# Patient Record
Sex: MALE | Race: WHITE | NOT HISPANIC OR LATINO | Employment: FULL TIME | URBAN - METROPOLITAN AREA
[De-identification: names, ages, dates, MRNs, and addresses within clinical notes are randomized per-mention and may not be internally consistent; named-entity substitution may affect disease eponyms.]

---

## 2017-12-07 ENCOUNTER — COMMUNICATION - HEALTHEAST (OUTPATIENT)
Dept: ENDOCRINOLOGY | Facility: CLINIC | Age: 27
End: 2017-12-07

## 2017-12-08 ENCOUNTER — RECORDS - HEALTHEAST (OUTPATIENT)
Dept: ADMINISTRATIVE | Facility: OTHER | Age: 27
End: 2017-12-08

## 2017-12-14 ENCOUNTER — COMMUNICATION - HEALTHEAST (OUTPATIENT)
Dept: ENDOCRINOLOGY | Facility: CLINIC | Age: 27
End: 2017-12-14

## 2017-12-29 ENCOUNTER — COMMUNICATION - HEALTHEAST (OUTPATIENT)
Dept: ENDOCRINOLOGY | Facility: CLINIC | Age: 27
End: 2017-12-29

## 2017-12-29 DIAGNOSIS — E11.9 DIABETES MELLITUS (H): ICD-10-CM

## 2018-01-04 ENCOUNTER — OFFICE VISIT - HEALTHEAST (OUTPATIENT)
Dept: ENDOCRINOLOGY | Facility: CLINIC | Age: 28
End: 2018-01-04

## 2018-01-04 ENCOUNTER — COMMUNICATION - HEALTHEAST (OUTPATIENT)
Dept: ENDOCRINOLOGY | Facility: CLINIC | Age: 28
End: 2018-01-04

## 2018-01-04 DIAGNOSIS — E11.9 DIABETES MELLITUS (H): ICD-10-CM

## 2018-01-04 LAB
CALCIUM SERPL-MCNC: 9.6 MG/DL (ref 8.5–10.5)
CREAT SERPL-MCNC: 0.96 MG/DL (ref 0.7–1.3)
CREAT UR-MCNC: 121 MG/DL
GFR SERPL CREATININE-BSD FRML MDRD: >60 ML/MIN/1.73M2
HBA1C MFR BLD: 6.7 % (ref 3.5–6)
MICROALBUMIN UR-MCNC: 0.92 MG/DL (ref 0–1.99)
MICROALBUMIN/CREAT UR: 7.6 MG/G
POTASSIUM BLD-SCNC: 4.2 MMOL/L (ref 3.5–5)
PTH-INTACT SERPL-MCNC: 22 PG/ML (ref 10–86)
T4 FREE SERPL-MCNC: 1.1 NG/DL (ref 0.7–1.8)
TSH SERPL DL<=0.005 MIU/L-ACNC: 1.52 UIU/ML (ref 0.3–5)

## 2018-01-04 ASSESSMENT — MIFFLIN-ST. JEOR: SCORE: 1857.56

## 2018-01-05 LAB — 25(OH)D3 SERPL-MCNC: 30 NG/ML (ref 30–80)

## 2018-01-11 ENCOUNTER — COMMUNICATION - HEALTHEAST (OUTPATIENT)
Dept: ENDOCRINOLOGY | Facility: CLINIC | Age: 28
End: 2018-01-11

## 2018-01-15 ENCOUNTER — COMMUNICATION - HEALTHEAST (OUTPATIENT)
Dept: ENDOCRINOLOGY | Facility: CLINIC | Age: 28
End: 2018-01-15

## 2018-01-15 DIAGNOSIS — E11.9 DIABETES MELLITUS (H): ICD-10-CM

## 2018-01-16 ENCOUNTER — COMMUNICATION - HEALTHEAST (OUTPATIENT)
Dept: ENDOCRINOLOGY | Facility: CLINIC | Age: 28
End: 2018-01-16

## 2018-01-23 ENCOUNTER — COMMUNICATION - HEALTHEAST (OUTPATIENT)
Dept: ENDOCRINOLOGY | Facility: CLINIC | Age: 28
End: 2018-01-23

## 2018-01-23 DIAGNOSIS — E11.9 DIABETES MELLITUS (H): ICD-10-CM

## 2018-02-19 ENCOUNTER — COMMUNICATION - HEALTHEAST (OUTPATIENT)
Dept: ENDOCRINOLOGY | Facility: CLINIC | Age: 28
End: 2018-02-19

## 2018-02-19 DIAGNOSIS — E11.9 DIABETES MELLITUS (H): ICD-10-CM

## 2018-03-17 ENCOUNTER — COMMUNICATION - HEALTHEAST (OUTPATIENT)
Dept: ENDOCRINOLOGY | Facility: CLINIC | Age: 28
End: 2018-03-17

## 2018-03-17 DIAGNOSIS — E11.9 DIABETES MELLITUS (H): ICD-10-CM

## 2018-03-23 ENCOUNTER — COMMUNICATION - HEALTHEAST (OUTPATIENT)
Dept: ENDOCRINOLOGY | Facility: CLINIC | Age: 28
End: 2018-03-23

## 2018-03-23 DIAGNOSIS — E11.9 DIABETES (H): ICD-10-CM

## 2018-05-22 ENCOUNTER — COMMUNICATION - HEALTHEAST (OUTPATIENT)
Dept: ENDOCRINOLOGY | Facility: CLINIC | Age: 28
End: 2018-05-22

## 2018-05-22 DIAGNOSIS — E11.9 DIABETES MELLITUS (H): ICD-10-CM

## 2018-06-04 ENCOUNTER — COMMUNICATION - HEALTHEAST (OUTPATIENT)
Dept: ENDOCRINOLOGY | Facility: CLINIC | Age: 28
End: 2018-06-04

## 2018-06-04 DIAGNOSIS — E11.9 DIABETES MELLITUS (H): ICD-10-CM

## 2018-06-06 ENCOUNTER — COMMUNICATION - HEALTHEAST (OUTPATIENT)
Dept: ADMINISTRATIVE | Facility: CLINIC | Age: 28
End: 2018-06-06

## 2018-06-06 DIAGNOSIS — E11.9 DIABETES MELLITUS (H): ICD-10-CM

## 2018-06-18 ENCOUNTER — AMBULATORY - HEALTHEAST (OUTPATIENT)
Dept: ENDOCRINOLOGY | Facility: CLINIC | Age: 28
End: 2018-06-18

## 2018-06-18 DIAGNOSIS — E11.9 DIABETES MELLITUS (H): ICD-10-CM

## 2018-07-09 ENCOUNTER — OFFICE VISIT - HEALTHEAST (OUTPATIENT)
Dept: ENDOCRINOLOGY | Facility: CLINIC | Age: 28
End: 2018-07-09

## 2018-07-09 ENCOUNTER — AMBULATORY - HEALTHEAST (OUTPATIENT)
Dept: LAB | Facility: CLINIC | Age: 28
End: 2018-07-09

## 2018-07-09 DIAGNOSIS — E11.9 DIABETES MELLITUS (H): ICD-10-CM

## 2018-07-09 LAB
CHOLEST SERPL-MCNC: 154 MG/DL
CREAT SERPL-MCNC: 0.88 MG/DL (ref 0.7–1.3)
CREAT UR-MCNC: 159.7 MG/DL
FASTING STATUS PATIENT QL REPORTED: NORMAL
GFR SERPL CREATININE-BSD FRML MDRD: >60 ML/MIN/1.73M2
HBA1C MFR BLD: 6.5 % (ref 3.5–6)
HDLC SERPL-MCNC: 55 MG/DL
LDLC SERPL CALC-MCNC: 86 MG/DL
MICROALBUMIN UR-MCNC: 0.68 MG/DL (ref 0–1.99)
MICROALBUMIN/CREAT UR: 4.3 MG/G
POTASSIUM BLD-SCNC: 4.1 MMOL/L (ref 3.5–5)
TRIGL SERPL-MCNC: 63 MG/DL

## 2018-07-09 ASSESSMENT — MIFFLIN-ST. JEOR: SCORE: 1869.35

## 2018-07-10 ENCOUNTER — COMMUNICATION - HEALTHEAST (OUTPATIENT)
Dept: ENDOCRINOLOGY | Facility: CLINIC | Age: 28
End: 2018-07-10

## 2018-07-10 DIAGNOSIS — E11.9 DIABETES MELLITUS (H): ICD-10-CM

## 2018-07-16 ENCOUNTER — COMMUNICATION - HEALTHEAST (OUTPATIENT)
Dept: ENDOCRINOLOGY | Facility: CLINIC | Age: 28
End: 2018-07-16

## 2018-07-16 DIAGNOSIS — E11.9 DIABETES MELLITUS (H): ICD-10-CM

## 2018-07-19 ENCOUNTER — COMMUNICATION - HEALTHEAST (OUTPATIENT)
Dept: ENDOCRINOLOGY | Facility: CLINIC | Age: 28
End: 2018-07-19

## 2018-08-08 ENCOUNTER — COMMUNICATION - HEALTHEAST (OUTPATIENT)
Dept: ADMINISTRATIVE | Facility: CLINIC | Age: 28
End: 2018-08-08

## 2018-08-08 DIAGNOSIS — E11.9 DIABETES MELLITUS (H): ICD-10-CM

## 2018-08-19 ENCOUNTER — COMMUNICATION - HEALTHEAST (OUTPATIENT)
Dept: ENDOCRINOLOGY | Facility: CLINIC | Age: 28
End: 2018-08-19

## 2018-08-19 DIAGNOSIS — E11.9 DIABETES (H): ICD-10-CM

## 2018-08-27 ENCOUNTER — OFFICE VISIT - HEALTHEAST (OUTPATIENT)
Dept: EDUCATION SERVICES | Facility: CLINIC | Age: 28
End: 2018-08-27

## 2018-08-27 DIAGNOSIS — E10.9 DIABETES MELLITUS TYPE 1 (H): ICD-10-CM

## 2018-09-11 ENCOUNTER — RECORDS - HEALTHEAST (OUTPATIENT)
Dept: ADMINISTRATIVE | Facility: OTHER | Age: 28
End: 2018-09-11

## 2018-11-01 ENCOUNTER — COMMUNICATION - HEALTHEAST (OUTPATIENT)
Dept: ADMINISTRATIVE | Facility: CLINIC | Age: 28
End: 2018-11-01

## 2018-11-01 DIAGNOSIS — E11.9 DIABETES MELLITUS (H): ICD-10-CM

## 2018-11-07 ENCOUNTER — COMMUNICATION - HEALTHEAST (OUTPATIENT)
Dept: ENDOCRINOLOGY | Facility: CLINIC | Age: 28
End: 2018-11-07

## 2018-11-07 DIAGNOSIS — E11.9 DIABETES MELLITUS (H): ICD-10-CM

## 2018-11-15 ENCOUNTER — COMMUNICATION - HEALTHEAST (OUTPATIENT)
Dept: ADMINISTRATIVE | Facility: CLINIC | Age: 28
End: 2018-11-15

## 2018-11-21 ENCOUNTER — COMMUNICATION - HEALTHEAST (OUTPATIENT)
Dept: ENDOCRINOLOGY | Facility: CLINIC | Age: 28
End: 2018-11-21

## 2018-11-21 DIAGNOSIS — E11.9 DIABETES MELLITUS (H): ICD-10-CM

## 2018-11-21 DIAGNOSIS — E11.9 DIABETES (H): ICD-10-CM

## 2018-11-26 RX ORDER — GLUCOSAMINE HCL/CHONDROITIN SU 500-400 MG
1 CAPSULE ORAL PRN
Qty: 300 STRIP | Refills: 0 | Status: SHIPPED | OUTPATIENT
Start: 2018-11-26 | End: 2022-02-07

## 2018-12-14 ENCOUNTER — COMMUNICATION - HEALTHEAST (OUTPATIENT)
Dept: ENDOCRINOLOGY | Facility: CLINIC | Age: 28
End: 2018-12-14

## 2018-12-14 DIAGNOSIS — E11.9 DIABETES (H): ICD-10-CM

## 2018-12-14 RX ORDER — PEN NEEDLE, DIABETIC 32GX 5/32"
NEEDLE, DISPOSABLE MISCELLANEOUS
Qty: 300 EACH | Refills: 0 | Status: SHIPPED | OUTPATIENT
Start: 2018-12-14 | End: 2022-02-14

## 2018-12-21 ENCOUNTER — AMBULATORY - HEALTHEAST (OUTPATIENT)
Dept: ENDOCRINOLOGY | Facility: CLINIC | Age: 28
End: 2018-12-21

## 2018-12-21 DIAGNOSIS — E11.9 DIABETES MELLITUS (H): ICD-10-CM

## 2019-01-11 ENCOUNTER — RECORDS - HEALTHEAST (OUTPATIENT)
Dept: ADMINISTRATIVE | Facility: CLINIC | Age: 29
End: 2019-01-11

## 2019-02-20 ENCOUNTER — COMMUNICATION - HEALTHEAST (OUTPATIENT)
Dept: ADMINISTRATIVE | Facility: CLINIC | Age: 29
End: 2019-02-20

## 2019-02-20 ENCOUNTER — COMMUNICATION - HEALTHEAST (OUTPATIENT)
Dept: ENDOCRINOLOGY | Facility: CLINIC | Age: 29
End: 2019-02-20

## 2019-03-01 ENCOUNTER — TELEPHONE (OUTPATIENT)
Dept: OTHER | Facility: CLINIC | Age: 29
End: 2019-03-01

## 2019-04-09 ENCOUNTER — COMMUNICATION - HEALTHEAST (OUTPATIENT)
Dept: ADMINISTRATIVE | Facility: CLINIC | Age: 29
End: 2019-04-09

## 2021-01-27 ENCOUNTER — AMBULATORY - HEALTHEAST (OUTPATIENT)
Dept: NURSING | Facility: CLINIC | Age: 31
End: 2021-01-27

## 2021-02-17 ENCOUNTER — AMBULATORY - HEALTHEAST (OUTPATIENT)
Dept: NURSING | Facility: CLINIC | Age: 31
End: 2021-02-17

## 2021-05-31 VITALS — WEIGHT: 197.6 LBS | BODY MASS INDEX: 28.29 KG/M2 | HEIGHT: 70 IN

## 2021-06-01 VITALS — WEIGHT: 200.2 LBS | HEIGHT: 70 IN | BODY MASS INDEX: 28.66 KG/M2

## 2021-06-15 NOTE — PROGRESS NOTES
Progress Note    Reason for Visit:  Chief Complaint     Diabetes Mellitus          Progress Note:    HPI: This patient is a new patient to me has type 1 diabetes since the age of 14 month.    He is a 27-year-old male patient who is currently on Humalog pen using prefilled cartridges.    He takes 1 unit for every 15 g of carbs was 1 unit for every 50 points above 150 as a correction.    He also uses Toujeo 20 units at bedtime.    The patient according to him last A1c is 6.8.    His blood sugar ranges between 80-1 50 check his blood sugar 3-4 times a day.    The patient has no hypoglycemia unawareness and no microvascular complications.    The patient does not smoke drinks rarely.    He works as a chiropractor.    His paternal uncle has type 1 diabetes.  Patient is not  has no children.    Review of Systems:    Nervous System: No headache, dizziness, fainting or memory loss. No tingling sensation of hand or feet.  Ears: No hearing loss or ringing in the ears  Eyes: No blurring of vision, redness, itching or dryness.  Nose: No nosebleed or loss of smell  Mouth: No mouth sores or loss of taste  Throat: No hoarseness or difficulty swallowing  Neck: No enlarged thyroid or lymph nodes.  Heart: No chest pain, palpitation or irregular heartbeat. No swelling of hands or feet  Lungs: No shortness of breath, cough, night sweats, wheezing or hemoptysis.  Gastrointestinal: No nausea or vomiting, constipation or diarrhea.  No acid reflux, abdominal pain or blood in stools.  Kidney/Bladdr: No polyuria, polydipsia, nocturia or hematuria.  Genital/Sexual: No loss of libido  Skin: No rash, hair loss or hirsutism.  No abnormal striae  Muscles/Joints/Bones: No morning stiffness, muscle aches and pain or loss of height.    Current Medications:  Current Outpatient Prescriptions   Medication Sig     insulin lispro (HUMALOG KWIKPEN) 100 unit/mL pen Inject 25 Units under the skin 3 (three) times a day with meals. (Patient taking  "differently: Inject 25 Units under the skin 3 (three) times a day with meals. Pt inject depend on the size of the meal)       Patients Active Problems:  Patient Active Problem List   Diagnosis     Diabetes mellitus       History:      has no tobacco, alcohol, and drug history on file.  History   Smoking Status     Not on file   Smokeless Tobacco     Not on file      has no tobacco, alcohol, and drug history on file.  History   Sexual Activity     Sexual activity: Not on file     No past medical history on file.  No family history on file.  No past medical history on file.  No past surgical history on file.    Vitals   height is 5' 10\" (1.778 m) and weight is 197 lb 9.6 oz (89.6 kg). His blood pressure is 124/72.         Exam  General appearance: The patient looked well, not in acute distress.  Eyes: no evidence of thyroid eye disease.   Retinal exam: No evidence of diabetic retinopathy.  Mouth and Throat: Normal  Neck: No evidence of thyromegaly, enlarged lymph node or tenderness  Chest: Trachea is central. Chest is clear to auscultation and percussion. Breat sounds are normal.  Cardiovascular exam: JVP is not raised. Heart sounds are normal, no murmurs or rub  Peripheral pulses are palpable.   Abdomen: No masses or tenderness.    Back: No vertebral tenderness or kyphosis.  Extremities: No evidence of leg edema.   Skin: Normal to touch.  No abnormal striae  Neurologic exam:  Visual fields are intact by confrontation, grossly intact. No evidence of peripheral neuropathy.  Detailed foot exam normal.        Diagnosis:  Encounter Diagnoses   Name Primary?     Diabetes mellitus Yes       Orders:   Orders Placed This Encounter   Procedures     Creatinine     Glycosylated Hemoglobin A1c     Potassium     Microalbumin, Random Urine     Thyroid Stimulating Hormone (TSH)     T4, Free     Calcium     Parathyroid Hormone Intact     Vitamin D, Total (25-Hydroxy)         Assessment and Plan: Type 1 diabetes I discussed the " pathophysiology of the disease with the patient.    The patient refuses to have insulin pump he said he does not like anything at that should do his body.        He likes to continues to intermediate as he is avoids fluctuation in his blood sugar and will continue with that otherwise he will take vesicular at the same dose.    I renewed his Humalog.    The patient has no hypoglycemia unawareness he has no microvascular complication foot exam is normal.    I have downloaded his glucometer he check his blood sugar roughly 5 times a day on average the lowest number is 43 the highest is 300.  The average blood sugar is 159.    Blood sugar in the morning is 191 during the day it can drop down to 50 6 at night occasionally 256.  The patient is refusing to go on insulin pump as mentioned we will continue as above.    I will check fasting lipids.    We will do labs today.    I have downloaded his meter.    Patient return to clinic in 6 month I did spend 60 minutes with the patient more than 50% was spent on counseling and managing his care.

## 2021-06-16 PROBLEM — E11.9 DIABETES MELLITUS (H): Status: ACTIVE | Noted: 2017-12-29

## 2021-06-16 NOTE — TELEPHONE ENCOUNTER
Telephone Encounter by Tonia Durand at 2/22/2019  3:45 PM     Author: Tonia Durand Service: -- Author Type: --    Filed: 2/22/2019  3:45 PM Encounter Date: 2/20/2019 Status: Signed    : Tonia Durand APPROVED:    Approval start date:02/20/2019  Approval end date:02/20/2020    Pharmacy has been notified of approval and will contact patient when medication is ready for pickup.

## 2021-06-16 NOTE — TELEPHONE ENCOUNTER
Telephone Encounter by Tonia Durand at 2/22/2019  2:22 PM     Author: Tonia Durand Service: -- Author Type: --    Filed: 2/22/2019  2:22 PM Encounter Date: 2/20/2019 Status: Signed    : Tonia Durand PA team  [phone]    PA has been initiated.

## 2021-06-19 NOTE — PROGRESS NOTES
Progress Note    Reason for Visit:  Chief Complaint     Diabetes Mellitus          Progress Note:    HPI: This patient is here for follow-up because of type 1 diabetes      Component      Latest Ref Rng & Units 1/4/2018   Creatinine      0.70 - 1.30 mg/dL 0.96   GFR MDRD Af Amer      >60 mL/min/1.73m2 >60   GFR MDRD Non Af Amer      >60 mL/min/1.73m2 >60   Microalbumin, Random Urine      0.00 - 1.99 mg/dL 0.92   Creatinine, Urine      mg/dL 121.0   Microalbumin/Creatinine Ratio Random Urine      <=19.9 mg/g 7.6   Hemoglobin A1c      3.5 - 6.0 % 6.7 (H)   Potassium      3.5 - 5.0 mmol/L 4.2   TSH      0.30 - 5.00 uIU/mL 1.52   Free T4      0.7 - 1.8 ng/dL 1.1   Calcium      8.5 - 10.5 mg/dL 9.6   PTH      10 - 86 pg/mL 22   Vitamin D, Total (25-Hydroxy)      30.0 - 80.0 ng/mL 30.0       Review of Systems:    Nervous System: No headache, dizziness, fainting or memory loss. No tingling sensation of hand or feet.  Ears: No hearing loss or ringing in the ears  Eyes: No blurring of vision, redness, itching or dryness.  Nose: No nosebleed or loss of smell  Mouth: No mouth sores or loss of taste  Throat: No hoarseness or difficulty swallowing  Neck: No enlarged thyroid or lymph nodes.  Heart: No chest pain, palpitation or irregular heartbeat. No swelling of hands or feet  Lungs: No shortness of breath, cough, night sweats, wheezing or hemoptysis.  Gastrointestinal: No nausea or vomiting, constipation or diarrhea.  No acid reflux, abdominal pain or blood in stools.  Kidney/Bladdr: No polyuria, polydipsia, nocturia or hematuria.  Genital/Sexual: No loss of libido  Skin: No rash, hair loss or hirsutism.  No abnormal striae  Muscles/Joints/Bones: No morning stiffness, muscle aches and pain or loss of height.    Current Medications:  Current Outpatient Prescriptions   Medication Sig     BASAGLAR KWIKPEN U-100 INSULIN 100 unit/mL (3 mL) pen INJECT 20 UNITS UNDER THE SKIN AT BEDTIME     blood glucose test (CONTOUR NEXT TEST  "STRIPS) strips USE UP TO 6 STRIPS AS DIRECTED DAILY.     insulin lispro (HUMALOG KWIKPEN INSULIN) 100 unit/mL pen Inject 25 Units under the skin 3 (three) times a day with meals. Pt inject depend on the size of the meal     pen needle, diabetic (PEN NEEDLE) 31 gauge x 5/16\" Ndle Use to inject insulin 4 times daily.       Patients Active Problems:  Patient Active Problem List   Diagnosis     Diabetes mellitus (H)       History:   reports that he has never smoked. He has never used smokeless tobacco.   reports that he has never smoked. He has never used smokeless tobacco. His alcohol and drug histories are not on file.  History   Smoking Status     Never Smoker   Smokeless Tobacco     Never Used      reports that he has never smoked. He has never used smokeless tobacco. His alcohol and drug histories are not on file.  History   Sexual Activity     Sexual activity: Not on file     No past medical history on file.  No family history on file.  No past medical history on file.  No past surgical history on file.    Vitals   height is 5' 10\" (1.778 m) and weight is 200 lb 3.2 oz (90.8 kg). His blood pressure is 104/60.         Exam  General appearance: The patient looked well, not in acute distress.  Eyes: no evidence of thyroid eye disease.   Retinal exam: No evidence of diabetic retinopathy.  Mouth and Throat: Normal  Neck: No evidence of thyromegaly, enlarged lymph node or tenderness  Chest: Trachea is central. Chest is clear to auscultation and percussion. Breat sounds are normal.  Cardiovascular exam: JVP is not raised. Heart sounds are normal, no murmurs or rub  Peripheral pulses are palpable.   Abdomen: No masses or tenderness.    Back: No vertebral tenderness or kyphosis.  Extremities: No evidence of leg edema.   Skin: Normal to touch.  No abnormal striae  Neurologic exam:  Visual fields are intact by confrontation, grossly intact. No evidence of peripheral neuropathy.  Detailed foot exam " normal.        Diagnosis:  No diagnosis found.    Orders:   No orders of the defined types were placed in this encounter.        Assessment and Plan:   Type 1 diabetes.  The patient is currently on Humalog he takes 1 unit for every 15 g of carbs was 1 unit for every 50 points above 150.    He is also on basaglar 20 units in the evening.    Last A1c is 6.7.  He had labs done today.    He has been running into hypoglycemia and went down to the 30s.    I have downloaded his meter and there are frequent numbers 49- 58.    The average is 153 the lowest is 39 the highest is 304.    I have explained to the patient that more hypoglycemia.  Exposes himself to he will lose the ability to recognize that and I stressed to him again the importance of getting a continuous glucose monitoring since the patient was refusing that but finally agreed today so I referred him to the diabetic educator.    I will give him a glucagon kit and prescription to check his blood sugars 10 times a day and that is important for the hypoglycemia discussed about the proportions again.    Patient will return to clinic in 6 month.  He has no microvascular complications.    I have reviewed and ordered clinical lab test    I have reviewed and ordered radiology tests.    I have reviewed and ordered her medication as required.    I have reviewed her test results and advised with the performing physician.    I have reviewed the patient's old records.    I have reviewed and summarized the patient old records.    I did spend 40 minutes with the patient more than 50% was spent on counseling and managing her care.

## 2021-06-20 NOTE — PROGRESS NOTES
Assessment: pt seen today for DM education. He has been diabetic since he was 15 months old. Pt brings in BG meter. He has really been struggling with low BG.   In the past 1 week he has had 9 episodes of low B, 49, 43, 60, 68, 56, 47, 40. Lows are usually first thing in the morning but could happen at any time. He is not feeling the lows until they get into the 30-40's. He mostly feels a migraine with lows occur. This morning he had a low of 40 mg/dl in which he did require assistance from his dad.   He is checking BG 8-10x/day. High BG are few.   Pt is interested in a CGM to help him detect lows and hopefully prevent from getting too low. We discussed options today and he would like to go ahead and try for the dexcom.   Of note - pt recently moved back here after living in [place] and completing chiropractic school.     Plan: will decrease Lantus to 18 units. Call if you do not hear from dexcom within 2 weeks, otherwise call to schedule training once dexcom supplies are received.      Subjective and Objective:      Davis EBONY Garcia is referred by Dr. Alexander for Diabetes Education.     Lab Results   Component Value Date    HGBA1C 6.5 (H) 2018       Current diabetes medications:  lantus 20 units/day - decreased to 18 units today. (was taking basaglar but changed back to lantus as he did not prefer basaglar). Humalog 1:15 + 1:50>150      Education:     Monitoring   Meter (per above goals): Assessed and Discussed  Monitoring: Assessed and Discussed  BG goals: Discussed    Nutrition Management  Nutrition Management: Assessed and Discussed  Weight: Not addressed  Portions/Balance: Assessed, Discussed and Competent  Carb ID/Count: Competent  Label Reading: Competent  Heart Healthy Fats: Not addressed  Menu Planning: Competent  Dining Out: Competent  Physical Activity: Discussed and Competent  Medications: Assessed and Discussed  Injected Medications: Competent   Storage/Exp:Competent   Site Rotation: Competent    Sites Assessed: no    Diabetes Disease Process: Discussed    Acute Complications: Prevent, Detect, Treat:  Hypoglycemia: Assessed and Discussed  Hyperglycemia: Assessed and Discussed  Sick Days: Discussed  Driving: Discussed    Chronic Complications  Foot Care:Not addressed  Skin Care: Not addressed  Eye: Not addressed  ABC: Discussed  Teeth:Not addressed  Goal Setting and Problem Solving: Assessed and Discussed  Barriers: Assessed and Discussed  Psychosocial Adjustments: Assessed and Discussed      Time spent with the patient: 60 minutes for diabetes education and counseling.   Previous Education: no  Visit Type:SHELLEY Garcia  8/27/2018

## 2021-06-24 NOTE — TELEPHONE ENCOUNTER
Prior Authorization Request  Who s requesting:  Pharmacy  Pharmacy Name and Location: [facility]   Medication Name: contour test strips  Insurance Plan: see chart   Insurance Member ID Number:  See chart  Informed patient that prior authorizations can take up to 10 business days for response:   No  Okay to leave a detailed message: No

## 2021-06-24 NOTE — TELEPHONE ENCOUNTER
Benjamin Team    In regards of: blood glucose test strips (contour next test strips)    With patient's insurance it is still costing patient a lot for his test strips. He went online and found this website:  [employer] web, does not need a RX.    Wondering if we know about this website and if it is not fraudulent. Or if there are other websites he can use?     He can be reached @ [phone], ok to lv msg

## 2021-07-03 NOTE — ADDENDUM NOTE
Addendum Note by Dario Hayes CMA at 7/9/2018  1:14 PM     Author: Dario Hayes CMA Service: -- Author Type: Certified Medical Assistant    Filed: 7/9/2018  1:14 PM Encounter Date: 7/9/2018 Status: Signed    : Dario Hayes CMA (Certified Medical Assistant)    Addended by: DARIO HAYES on: 7/9/2018 01:14 PM        Modules accepted: Orders

## 2021-07-03 NOTE — ADDENDUM NOTE
Addendum Note by Dario Hayes CMA at 1/23/2018 11:24 AM     Author: Dario Hayes CMA Service: -- Author Type: Certified Medical Assistant    Filed: 1/23/2018 11:24 AM Encounter Date: 1/15/2018 Status: Signed    : Dario Hayes CMA (Certified Medical Assistant)    Addended by: DARIO HAYES on: 1/23/2018 11:24 AM        Modules accepted: Orders

## 2021-08-21 ENCOUNTER — HEALTH MAINTENANCE LETTER (OUTPATIENT)
Age: 31
End: 2021-08-21

## 2021-10-16 ENCOUNTER — HEALTH MAINTENANCE LETTER (OUTPATIENT)
Age: 31
End: 2021-10-16

## 2022-02-07 DIAGNOSIS — E11.9 DIABETES MELLITUS (H): ICD-10-CM

## 2022-02-07 RX ORDER — GLUCOSAMINE HCL/CHONDROITIN SU 500-400 MG
1 CAPSULE ORAL
Qty: 900 STRIP | Refills: 3 | Status: SHIPPED | OUTPATIENT
Start: 2022-02-07 | End: 2022-02-14

## 2022-02-07 NOTE — TELEPHONE ENCOUNTER
Spoke to patient. He is requesting the Contour Next Strips to be sent and has been testing his blood sugar 8-10 times daily.     Script prepped.

## 2022-02-07 NOTE — TELEPHONE ENCOUNTER
RECORDS RECEIVED FROM: internal    DATE RECEIVED: 3.3.22    NOTES (FOR ALL VISITS) STATUS DETAILS   OFFICE NOTES from referring provider internal  Per appt notes self referred    OFFICE NOTES from other specialist internal  2/7/22 [facility] NOTES     OPERATIVE REPORT  (thyroid, pituitary, adrenal, parathyroid)     MEDICATION LIST internal     IMAGING      DEXASCAN     MRI (BRAIN)     XR (Chest)     CT (HEAD/NECK/CHEST/ABDOMEN)     NUCLEAR      ULTRASOUND (HEAD/NECK)     LABS     DIABETES: HBGA1C, CREATININE, FASTING LIPIDS, MICROALBUMIN URINE, POTASSIUM, TSH, T4    THYROID: TSH, T4, CBC, THYRODLONULIN, TOTAL T3, FREE T4, CALCITONIN, CEA internal /ce  Lipid- 2.14.22  HBGA1- 2.14.22

## 2022-02-07 NOTE — TELEPHONE ENCOUNTER
Reason for call:  Medication   If this is a refill request, has the caller requested the refill from the pharmacy already? No  Will the patient be using a [facility]?   no    Name of the pharmacy and phone number for the current request:   On file [facility] in [place] on [address]    Name of the medication requested:   blood glucose test strips     Other request: *please call patient to discuss which test strips, he will need them very soon    Phone number to reach patient:  Cell number on file:    Telephone Information:   Mobile [phone]       Best Time:  any    Can we leave a detailed message on this number?  YES    Travel screening: Not Applicable

## 2022-02-10 ENCOUNTER — TELEPHONE (OUTPATIENT)
Dept: FAMILY MEDICINE | Facility: CLINIC | Age: 32
End: 2022-02-10
Payer: COMMERCIAL

## 2022-02-10 DIAGNOSIS — E11.9 TYPE 2 DIABETES MELLITUS WITHOUT COMPLICATION, WITHOUT LONG-TERM CURRENT USE OF INSULIN (H): Primary | ICD-10-CM

## 2022-02-10 NOTE — TELEPHONE ENCOUNTER
PA fax request from pharmacy for: Glucose Blood (BLOOD GLUCOSE TEST STRIPS) STRP, Sig - Route: 1 each 8 times daily Use one strip to test blood sugars 8-10 times daily. - Does not apply.    Jes QUINTERO CMA ([facility])

## 2022-02-10 NOTE — TELEPHONE ENCOUNTER
PT called back and would need the one touch meter sent in as well to use the one touch test strips.

## 2022-02-14 ENCOUNTER — OFFICE VISIT (OUTPATIENT)
Dept: FAMILY MEDICINE | Facility: CLINIC | Age: 32
End: 2022-02-14
Payer: COMMERCIAL

## 2022-02-14 VITALS
WEIGHT: 198.1 LBS | DIASTOLIC BLOOD PRESSURE: 82 MMHG | SYSTOLIC BLOOD PRESSURE: 110 MMHG | BODY MASS INDEX: 28.42 KG/M2 | HEART RATE: 56 BPM

## 2022-02-14 DIAGNOSIS — E10.9 TYPE 1 DIABETES MELLITUS WITHOUT COMPLICATION (H): Primary | ICD-10-CM

## 2022-02-14 LAB
ALBUMIN SERPL-MCNC: 4.3 G/DL (ref 3.5–5)
ALP SERPL-CCNC: 51 U/L (ref 45–120)
ALT SERPL W P-5'-P-CCNC: 31 U/L (ref 0–45)
ANION GAP SERPL CALCULATED.3IONS-SCNC: 8 MMOL/L (ref 5–18)
AST SERPL W P-5'-P-CCNC: 23 U/L (ref 0–40)
BILIRUB SERPL-MCNC: 0.7 MG/DL (ref 0–1)
BUN SERPL-MCNC: 15 MG/DL (ref 8–22)
CALCIUM SERPL-MCNC: 9.8 MG/DL (ref 8.5–10.5)
CHLORIDE BLD-SCNC: 104 MMOL/L (ref 98–107)
CHOLEST SERPL-MCNC: 177 MG/DL
CO2 SERPL-SCNC: 28 MMOL/L (ref 22–31)
CREAT SERPL-MCNC: 0.94 MG/DL (ref 0.7–1.3)
CREAT UR-MCNC: 82 MG/DL
FASTING STATUS PATIENT QL REPORTED: YES
GFR SERPL CREATININE-BSD FRML MDRD: >90 ML/MIN/1.73M2
GLUCOSE BLD-MCNC: 143 MG/DL (ref 70–125)
HBA1C MFR BLD: 6.6 % (ref 0–5.6)
HDLC SERPL-MCNC: 65 MG/DL
LDLC SERPL CALC-MCNC: 104 MG/DL
MICROALBUMIN UR-MCNC: 0.55 MG/DL (ref 0–1.99)
MICROALBUMIN/CREAT UR: 6.7 MG/G CR
POTASSIUM BLD-SCNC: 4.7 MMOL/L (ref 3.5–5)
PROT SERPL-MCNC: 6.7 G/DL (ref 6–8)
SODIUM SERPL-SCNC: 140 MMOL/L (ref 136–145)
TRIGL SERPL-MCNC: 42 MG/DL

## 2022-02-14 PROCEDURE — 36415 COLL VENOUS BLD VENIPUNCTURE: CPT | Performed by: FAMILY MEDICINE

## 2022-02-14 PROCEDURE — 82043 UR ALBUMIN QUANTITATIVE: CPT | Performed by: FAMILY MEDICINE

## 2022-02-14 PROCEDURE — 80053 COMPREHEN METABOLIC PANEL: CPT | Performed by: FAMILY MEDICINE

## 2022-02-14 PROCEDURE — 83036 HEMOGLOBIN GLYCOSYLATED A1C: CPT | Performed by: FAMILY MEDICINE

## 2022-02-14 PROCEDURE — 80061 LIPID PANEL: CPT | Performed by: FAMILY MEDICINE

## 2022-02-14 PROCEDURE — 99203 OFFICE O/P NEW LOW 30 MIN: CPT | Performed by: FAMILY MEDICINE

## 2022-02-14 NOTE — PROGRESS NOTES
Assessment & Plan     Type 1 diabetes mellitus without complication (H)    We can certainly establish care with us here for primary care purposes.  I did put in a referral for endocrinology department.  He can call and get an appointment with them to manage his type 1 diabetes going forward.  We can cover him obviously until then.  We get some labs today in anticipation of a visit with endocrinology and those labs are listed below.  We can follow-up with him on the results when available.  I also sent an eye referral so we can get that taken care of as well.    - Adult Endocrinology  Referral; Future  - Adult Eye Referral; Future  - Albumin Random Urine Quantitative with Creat Ratio; Future  - Hemoglobin A1c; Future  - Comprehensive metabolic panel; Future  - Lipid panel reflex to direct LDL Fasting; Future  - Albumin Random Urine Quantitative with Creat Ratio  - Hemoglobin A1c  - Comprehensive metabolic panel  - Lipid panel reflex to direct LDL Fasting    Review of external notes as documented elsewhere in note  Review of the result(s) of each unique test - labs  Ordering of each unique test             No follow-ups on file.    Harpreet Youngblood MD  [facility]   Ryan is a 31 year old who presents for the following health issues     HPI     Patient is a new patient who is here for establishing care for primary care.  He also is a type I diabetic on insulin obviously and needs a referral to endocrinology.  He has an appointment set up, but just needs the paperwork behind it to be done.  Generally speaking he thinks he is under pretty good control with his diabetes.  He has not had labs done in a while though and would like to have that done as well today.  He has not an eye visit either and needs a referral to get that done.    Patient is new patient to the clinic. Please see past medical history, surgical history, social history and family history, all of which  were completed in their entirety today.         Review of Systems   Constitutional, HEENT, cardiovascular, pulmonary, GI, , musculoskeletal, neuro, skin, endocrine and psych systems are negative, except as otherwise noted.      Objective    /82 (BP Location: Left arm, Patient Position: Sitting, Cuff Size: Adult Large)   Pulse 56   Wt 89.9 kg (198 lb 1.6 oz)   BMI 28.42 kg/m    Body mass index is 28.42 kg/m .  Physical Exam   GENERAL: healthy, alert and no distress  NECK: no adenopathy, no asymmetry, masses, or scars and thyroid normal to palpation  RESP: lungs clear to auscultation - no rales, rhonchi or wheezes  CV: regular rate and rhythm, normal S1 S2, no S3 or S4, no murmur, click or rub, no peripheral edema and peripheral pulses strong  ABDOMEN: soft, nontender, no hepatosplenomegaly, no masses and bowel sounds normal  MS: no gross musculoskeletal defects noted, no edema  Diabetic foot exam: normal DP and PT pulses, no trophic changes or ulcerative lesions and normal sensory exam    Results for orders placed or performed in visit on 02/14/22   Albumin Random Urine Quantitative with Creat Ratio     Status: None   Result Value Ref Range    Microalbumin Urine mg/dL 0.55 0.00 - 1.99 mg/dL    Creatinine Urine mg/dL 82 mg/dL    Microalbumin Urine mg/g Cr 6.7 <=19.9 mg/g Cr    Narrative    Microalbumin, Random Urine   <2.0 mg/dL . . . . . . . . Normal   3.0-30.0 mg/dL . . . . . . Microalbuminuria   >30.0 mg/dL . . . . . .  . Clinical Proteinuria     Microalbumin/Creatinine Ratio, Random Urine   <20 mg/g . . . . .. . . . Normal    mg/g . . . . . . . Microalbuminuria   >300 mg/g . . . . . . . . Clinical Proteinuria   Hemoglobin A1c     Status: Abnormal   Result Value Ref Range    Hemoglobin A1C 6.6 (H) 0.0 - 5.6 %   Comprehensive metabolic panel     Status: Abnormal   Result Value Ref Range    Sodium 140 136 - 145 mmol/L    Potassium 4.7 3.5 - 5.0 mmol/L    Chloride 104 98 - 107 mmol/L    Carbon Dioxide  (CO2) 28 22 - 31 mmol/L    Anion Gap 8 5 - 18 mmol/L    Urea Nitrogen 15 8 - 22 mg/dL    Creatinine 0.94 0.70 - 1.30 mg/dL    Calcium 9.8 8.5 - 10.5 mg/dL    Glucose 143 (H) 70 - 125 mg/dL    Alkaline Phosphatase 51 45 - 120 U/L    AST 23 0 - 40 U/L    ALT 31 0 - 45 U/L    Protein Total 6.7 6.0 - 8.0 g/dL    Albumin 4.3 3.5 - 5.0 g/dL    Bilirubin Total 0.7 0.0 - 1.0 mg/dL    GFR Estimate >90 >60 mL/min/1.73m2   Lipid panel reflex to direct LDL Fasting     Status: None   Result Value Ref Range    Cholesterol 177 <=199 mg/dL    Triglycerides 42 <=149 mg/dL    Direct Measure HDL 65 >=40 mg/dL    LDL Cholesterol Calculated 104 <=129 mg/dL    Patient Fasting > 8hrs? Yes

## 2022-03-02 NOTE — PROGRESS NOTES
"  dm 1  Simon Salgado, [facility]         Diabetes & Endocrinology Clinic New Consult        Ryan Garcia MRN:[id number] YOB: 1990  Primary care provider: Harpreet Youngblood     Reason for Endocrine consult: Type 1 DM    HPI:  Ryan Garcia is a 31 year old male with PMHx of T1 DM since the age of 15 months, last HbA1c: 6.6 in 2/2022    Weight: 89.9 kg    Current DM meds:  lantus 17 unit(s) daily at 9: 30 PM   humalog 1:15 gm CHO  humalog ICR 1:50 above 150    He takes his insulin before meals, covers his carbs    He eats 3 meals, b'fast  @ 7 AM : bowl of cereal - 45 gm CHO, 8 ounces of milk : 12 gm , 12: 30 PM @ lunch: variable, tries to keep it under 60 gm CHO, 6: 30 PM dinner: same as lunch.    Physical Actvity: Works out either at lunch or in the evening, does 15- 20 mins cardio in the afternoon usually.  Weight lifting mostly in the evenings for 30 mins-45 mins    He gets low BG, this happened when he was taking basaglar or novolog. His BG was fluctuating a lot even if they were same doses, \"see saw\" as he describes, caused severe lows followed by spikes, was missing work on the basaglar and novolog. He says after changing to lantus and humalog his BG have been better and stable. He mentions he is skeptical to sleep at night so he checks even at night with a glucometer as he used to have lows often at night on previous regimen of basaglar and novolog    He is symptomatic with hypoglycemia - gets sweats, confusion, headaches around 50 mg/dl     He mentions he is skeptical to sleep at night so he checks even at night with a glucometer as he used to have lows often at night on previous regimen of basaglar and novolog. He doesn't want insulin pump and he is interested in CGM.     One touch verio - glucometer - 9.8 /day, checks his BG on his forearm and gives his insulin shots on thighs or arms   Avg glucose: 144  SD: 52  Very high:4  High:19  Target: 73%  Low :3  Very low:1    He does have " "hyperglycemia in the 3 AM - 6 Am, says his BG is higher after breakfast even if he is doing his carb coverage appropriately. He also has post prandial hyperglycemia intermittently      Sometimes has hyperglycemia after lifting weights                                                                 Diabetes complications include:  Retinopathy: last eye exam 5 years, has a referral  Nephropathy: non creat: 0.84, no U micro alb  Neuropathy: none  LDL:104 in 2/2022  Smoking: none  BP: well controlled   Had  a foot exam in 2/2022 which was normal     SH: works as chiropractor, doesn't smoke, social alcohol consumption, he is planning on moving to [place]    ROS:  All 12 systems were reviewed and negative except as mentioned in HPI    Past Medical/Surgical History:  Past Medical History:   Diagnosis Date     Type 1 diabetes (H)      No past surgical history on file.    Allergies:  No Known Allergies    PTA Meds:  Prior to Admission medications    Medication Sig Last Dose Taking? Auth Provider   blood glucose (NO BRAND SPECIFIED) test strip Use to test blood sugar 8 times daily or as directed.   Harpreet Youngblood MD   blood glucose meter (GLUCOMETER) [BLOOD GLUCOSE METER (GLUCOMETER)] Use 1 each As Directed daily. Dispense glucometer brand per patient's insurance at pharmacy discretion.   Duke Alexander MD   insulin glargine (LANTUS SOLOSTAR U-100 INSULIN) 100 unit/mL (3 mL) pen [INSULIN GLARGINE (LANTUS SOLOSTAR U-100 INSULIN) 100 UNIT/ML (3 ML) PEN] INJECT 20 UNITS SUBCUTANEOUSLY AT BEDTIME. DO NOT MIX LANTUS WITH ANY OTHER INSULIN.   Duke Alexander MD   insulin lispro (HUMALOG KWIKPEN INSULIN) 100 unit/mL pen [INSULIN LISPRO (HUMALOG KWIKPEN INSULIN) 100 UNIT/ML PEN] Inject 25 Units under the skin 3 (three) times a day with meals Pt inject depend on the size of the meal.   Duke Alexander MD   pen needle, diabetic (BD ULTRA-FINE KATRIN PEN NEEDLE) 32 gauge x 5/32\" Ndle [PEN NEEDLE, DIABETIC (BD ULTRA-FINE " "KATRIN PEN NEEDLE) 32 GAUGE X 5/32\" NDLE] INJECT SUBQUTANEOUSLY EIGHT TIMES DAILY.   Duke Alexander MD        Current heard:   Current Outpatient Medications   Medication     blood glucose (NO BRAND SPECIFIED) test strip     blood glucose meter (GLUCOMETER)     insulin glargine (LANTUS SOLOSTAR U-100 INSULIN) 100 unit/mL (3 mL) pen     insulin lispro (HUMALOG KWIKPEN INSULIN) 100 unit/mL pen     pen needle, diabetic (BD ULTRA-FINE KATRIN PEN NEEDLE) 32 gauge x 5/32\" Ndle     No current facility-administered medications for this visit.       Family History:  Family History   Problem Relation Age of Onset     Diabetes Father      Diabetes Paternal Grandmother        Social History:  Social History     Tobacco Use     Smoking status: Never Smoker     Smokeless tobacco: Never Used   Substance Use Topics     Alcohol use: Yes     Comment: 2 / week         Physical examination:  General appearance: seated comfortably in the chair during assessment. Not in any acute distress  HEENT: PEERLA.  No thyromegaly or LN palpable  Lungs: bilateral air entry equal. Clear to auscultation. No rhonchi or crepitations heard  Heart: S1 S2 normal. Pulse: regular rate and rhythm, good volume  Abdomen: soft, nondistended  Neurological: conscious and oriented. Speech: normal. Moving all four extremities equally  Extremities: no edema noted. Has some indurated areas on the lateral aspect of his arms bilaterally, non tender or erythematous  Feet: foot exam done on 2/14/2022 which was normal (pls refer to note of Dr. Harpreet Youngblood for details)  Psychiatric: normal mood and affect. Normal judgment    Endocrine Labs:         Assessment and Plan:     Ryan Garcia is a 31 year old male with PMHx of T1 DM since the age of 15 months, last HbA1c: 6.6 in 2/2022 who is here for establishing endocrine care     # Type 1 DM, with serious hypoglycemia when taking basaglar & novolog, hyperglycemia in am after breakfast and after weight lifting  Lab " Results   Component Value Date    A1C 6.6 02/14/2022     On reviewing BG , he does have early AM hyperglycemia and hyperglycemia post meals, he does carb count and takes humalog at appropriate times. He reports having higher BG post weight lifting.  He had severe low BG and volatile BG on basaglar and novolog combination. He is interested in CGM and doesn't want to use insulin pump    - Continue lantus 17 units daily  - Humalog 1: 15 gms CHO with meals  - Humalog 1: 50 above 150 ICR TID AC  - Prescribed carlos 2 for CGM as he is a T1DM and on MDI therapy , with CGM we can know trends better and make insulin adjustments next visit  - Advised him to rotate insulin injection sites, and to avoid the indurated or thickened areas on his arms bilaterally, recommended him to use his thighs or abdomen  - We placed CDE referral to help him with CGM and also a refresher course for carb counting    # Diabetes complications include:  Retinopathy: last eye exam was 5 years ago, has a referral  Nephropathy: non creat: 0.84, no U micro alb  Neuropathy: none  LDL:104 in 2/2022  Smoking: none  BP: well controlled   Had  a foot exam in 2/2022 which was normal     - RTC in 3-4 months    The patient was seen, examined and discussed with MD Gloria Back MD.  Endocrinology fellow    Attending addendum:  Pt was evaluated with endocrinology fellow & plan is as outlined in this note. Recommended higher protein diet given hyperglycemia after lifting or consider change of 1:15 to 1:10 gms CHO at meal prior to weight lifting. Higher protein associated with lower frequency of post-prandial hyperglycemic excursions.  For medical necessity reasons, he needs to have Lantus & Humalog insulin, since he had erratic unpredictable serious hypoglycemia when taking basaglar and novolog insulins.  Dr. Lorena Brown MD, MPH  Endocrinologist      Total Time: 60 min spent on chart review, evaluation, management, counseling, &  education on the day of encounter    Answers for HPI/ROS submitted by the patient on 3/2/2022  General Symptoms: No  Skin Symptoms: No  HENT Symptoms: No  EYE SYMPTOMS: No  HEART SYMPTOMS: No  LUNG SYMPTOMS: No  INTESTINAL SYMPTOMS: No  URINARY SYMPTOMS: No  REPRODUCTIVE SYMPTOMS: No  SKELETAL SYMPTOMS: No  BLOOD SYMPTOMS: No  NERVOUS SYSTEM SYMPTOMS: No  MENTAL HEALTH SYMPTOMS: No

## 2022-03-03 ENCOUNTER — TELEPHONE (OUTPATIENT)
Dept: ENDOCRINOLOGY | Facility: CLINIC | Age: 32
End: 2022-03-03

## 2022-03-03 ENCOUNTER — OFFICE VISIT (OUTPATIENT)
Dept: ENDOCRINOLOGY | Facility: CLINIC | Age: 32
End: 2022-03-03
Payer: COMMERCIAL

## 2022-03-03 ENCOUNTER — PRE VISIT (OUTPATIENT)
Dept: ENDOCRINOLOGY | Facility: CLINIC | Age: 32
End: 2022-03-03

## 2022-03-03 VITALS
WEIGHT: 199.6 LBS | BODY MASS INDEX: 27.94 KG/M2 | DIASTOLIC BLOOD PRESSURE: 80 MMHG | HEART RATE: 64 BPM | HEIGHT: 71 IN | SYSTOLIC BLOOD PRESSURE: 123 MMHG

## 2022-03-03 DIAGNOSIS — E10.649: Primary | ICD-10-CM

## 2022-03-03 DIAGNOSIS — E10.9 TYPE 1 DIABETES MELLITUS WITHOUT COMPLICATION (H): ICD-10-CM

## 2022-03-03 DIAGNOSIS — R73.9 HYPERGLYCEMIA: ICD-10-CM

## 2022-03-03 PROCEDURE — 99205 OFFICE O/P NEW HI 60 MIN: CPT | Mod: GC | Performed by: STUDENT IN AN ORGANIZED HEALTH CARE EDUCATION/TRAINING PROGRAM

## 2022-03-03 RX ORDER — INSULIN LISPRO 100 [IU]/ML
INJECTION, SOLUTION INTRAVENOUS; SUBCUTANEOUS
Qty: 30 ML | Refills: 11 | Status: SHIPPED | OUTPATIENT
Start: 2022-03-03 | End: 2022-03-07

## 2022-03-03 ASSESSMENT — PAIN SCALES - GENERAL: PAINLEVEL: NO PAIN (0)

## 2022-03-03 NOTE — TELEPHONE ENCOUNTER
PA Initiation    Medication: Lantus  Insurance Company: [employer] - Phone [phone] Fax [phone]  Pharmacy Filling the Rx:    Filling Pharmacy Phone:    Filling Pharmacy Fax:    Start Date: 3/3/2022

## 2022-03-03 NOTE — TELEPHONE ENCOUNTER
Lantus not covered. Preferred is Basaglar. Would you like to change to preferred or PA    Humalog Kwikpen not covered. Preferred is Novolog Flexpen. Would you like to change to preferred or PA

## 2022-03-03 NOTE — PATIENT INSTRUCTIONS
We appreciate your assistance in coordinating your healthcare.     Please upload your insulin pump, blood sugar meter and/or continuous glucose monitor at home 1-2 days before your next diabetes-related appointment.   This will allow your provider to review your  data before your scheduled virtual visit.    To ask a question to your Endocrine care team, please send them a mSnap message, or reach them by phone at [phone]     To expedite your medication refill(s), please contact your pharmacy and have them   fax a refill request to: [phone].  *Please allow 3 business days for routine medication refills.  *Please allow 5 business days for controlled substance medication refills.    For after-hours urgent Endocrine issues, that do not require 911, please dial [phone], and ask to speak with the Endocrinologist On-Call      Continue with lantus 17 unit(s) daily  Continue with humalog 1: 15 gms CHO  Continue with humalog 1:50 above 150 mg/dl    We ordered CGM - carlos 2     We also ordered diabetes educator referral    Rotate your insulin injection sites and avoid the indurated or thickened areas

## 2022-03-03 NOTE — TELEPHONE ENCOUNTER
PA Initiation    Medication: Humalog  Insurance Company: [employer] - Phone [phone] Fax [phone]  Pharmacy Filling the Rx:    Filling Pharmacy Phone:    Filling Pharmacy Fax:    Start Date: 3/3/2022

## 2022-03-03 NOTE — LETTER
"3/3/2022       RE: Ryan Garcia  [address]  [facility] [address]     Dear Colleague,    Thank you for referring your patient, Ryan Garcia, to the [facility] [place] at [facility]. Please see a copy of my visit note below.      dm 1  Simon Salgado, ROXANN         Diabetes & Endocrinology Clinic New Consult        Ryan Garcia MRN:[id number] YOB: 1990  Primary care provider: Harpreet Youngblood     Reason for Endocrine consult: Type 1 DM    HPI:  Ryan Garcia is a 31 year old male with PMHx of T1 DM since the age of 15 months, last HbA1c: 6.6 in 2/2022    Weight: 89.9 kg    Current DM meds:  lantus 17 unit(s) daily at 9: 30 PM   humalog 1:15 gm CHO  humalog ICR 1:50 above 150    He takes his insulin before meals, covers his carbs    He eats 3 meals, b'fast  @ 7 AM : bowl of cereal - 45 gm CHO, 8 ounces of milk : 12 gm , 12: 30 PM @ lunch: variable, tries to keep it under 60 gm CHO, 6: 30 PM dinner: same as lunch.    Physical Actvity: Works out either at lunch or in the evening, does 15- 20 mins cardio in the afternoon usually.  Weight lifting mostly in the evenings for 30 mins-45 mins    He gets low BG, this happened when he was taking basaglar or novolog. His BG was fluctuating a lot even if they were same doses, \"see saw\" as he describes, caused severe lows followed by spikes, was missing work on the basaglar and novolog. He says after changing to lantus and humalog his BG have been better and stable. He mentions he is skeptical to sleep at night so he checks even at night with a glucometer as he used to have lows often at night on previous regimen of basaglar and novolog    He is symptomatic with hypoglycemia - gets sweats, confusion, headaches around 50 mg/dl     He mentions he is skeptical to sleep at night so he checks even at night with a glucometer as he used to have lows often at night on " previous regimen of basaglar and novolog. He doesn't want insulin pump and he is interested in CGM.     One touch verio - glucometer - 9.8 /day, checks his BG on his forearm and gives his insulin shots on thighs or arms   Avg glucose: 144  SD: 52  Very high:4  High:19  Target: 73%  Low :3  Very low:1    He does have hyperglycemia in the 3 AM - 6 Am, says his BG is higher after breakfast even if he is doing his carb coverage appropriately. He also has post prandial hyperglycemia intermittently      Sometimes has hyperglycemia after lifting weights                                                                 Diabetes complications include:  Retinopathy: last eye exam 5 years, has a referral  Nephropathy: non creat: 0.84, no U micro alb  Neuropathy: none  LDL:104 in 2/2022  Smoking: none  BP: well controlled   Had  a foot exam in 2/2022 which was normal     SH: works as chiropractor, doesn't smoke, social alcohol consumption, he is planning on moving to [place]    ROS:  All 12 systems were reviewed and negative except as mentioned in HPI    Past Medical/Surgical History:  Past Medical History:   Diagnosis Date     Type 1 diabetes (H)      No past surgical history on file.    Allergies:  No Known Allergies    PTA Meds:  Prior to Admission medications    Medication Sig Last Dose Taking? Auth Provider   blood glucose (NO BRAND SPECIFIED) test strip Use to test blood sugar 8 times daily or as directed.   Harpreet Youngblood MD   blood glucose meter (GLUCOMETER) [BLOOD GLUCOSE METER (GLUCOMETER)] Use 1 each As Directed daily. Dispense glucometer brand per patient's insurance at pharmacy discretion.   Duke Alexander MD   insulin glargine (LANTUS SOLOSTAR U-100 INSULIN) 100 unit/mL (3 mL) pen [INSULIN GLARGINE (LANTUS SOLOSTAR U-100 INSULIN) 100 UNIT/ML (3 ML) PEN] INJECT 20 UNITS SUBCUTANEOUSLY AT BEDTIME. DO NOT MIX LANTUS WITH ANY OTHER INSULIN.   Duke Alexander MD   insulin lispro (HUMALOG KWIKPEN INSULIN) 100  "unit/mL pen [INSULIN LISPRO (HUMALOG KWIKPEN INSULIN) 100 UNIT/ML PEN] Inject 25 Units under the skin 3 (three) times a day with meals Pt inject depend on the size of the meal.   Duke Alexander MD   pen needle, diabetic (BD ULTRA-FINE KATRIN PEN NEEDLE) 32 gauge x 5/32\" Ndle [PEN NEEDLE, DIABETIC (BD ULTRA-FINE KATRIN PEN NEEDLE) 32 GAUGE X 5/32\" NDLE] INJECT SUBQUTANEOUSLY EIGHT TIMES DAILY.   Duke Alexander MD        Current heard:   Current Outpatient Medications   Medication     blood glucose (NO BRAND SPECIFIED) test strip     blood glucose meter (GLUCOMETER)     insulin glargine (LANTUS SOLOSTAR U-100 INSULIN) 100 unit/mL (3 mL) pen     insulin lispro (HUMALOG KWIKPEN INSULIN) 100 unit/mL pen     pen needle, diabetic (BD ULTRA-FINE KATRIN PEN NEEDLE) 32 gauge x 5/32\" Ndle     No current facility-administered medications for this visit.       Family History:  Family History   Problem Relation Age of Onset     Diabetes Father      Diabetes Paternal Grandmother        Social History:  Social History     Tobacco Use     Smoking status: Never Smoker     Smokeless tobacco: Never Used   Substance Use Topics     Alcohol use: Yes     Comment: 2 / week         Physical examination:  General appearance: seated comfortably in the chair during assessment. Not in any acute distress  HEENT: PEERLA.  No thyromegaly or LN palpable  Lungs: bilateral air entry equal. Clear to auscultation. No rhonchi or crepitations heard  Heart: S1 S2 normal. Pulse: regular rate and rhythm, good volume  Abdomen: soft, nondistended  Neurological: conscious and oriented. Speech: normal. Moving all four extremities equally  Extremities: no edema noted. Has some indurated areas on the lateral aspect of his arms bilaterally, non tender or erythematous  Feet: foot exam done on 2/14/2022 which was normal (pls refer to note of Dr. Harpreet Youngblood for details)  Psychiatric: normal mood and affect. Normal judgment    Endocrine Labs:         Assessment " and Plan:     Ryan Garcia is a 31 year old male with PMHx of T1 DM since the age of 15 months, last HbA1c: 6.6 in 2/2022 who is here for establishing endocrine care     # Type 1 DM, with serious hypoglycemia when taking basaglar & novolog, hyperglycemia in am after breakfast and after weight lifting  Lab Results   Component Value Date    A1C 6.6 02/14/2022     On reviewing BG , he does have early AM hyperglycemia and hyperglycemia post meals, he does carb count and takes humalog at appropriate times. He reports having higher BG post weight lifting.  He had severe low BG and volatile BG on basaglar and novolog combination. He is interested in CGM and doesn't want to use insulin pump    - Continue lantus 17 units daily  - Humalog 1: 15 gms CHO with meals  - Humalog 1: 50 above 150 ICR TID AC  - Prescribed carlos 2 for CGM as he is a T1DM and on MDI therapy , with CGM we can know trends better and make insulin adjustments next visit  - Advised him to rotate insulin injection sites, and to avoid the indurated or thickened areas on his arms bilaterally, recommended him to use his thighs or abdomen  - We placed CDE referral to help him with CGM and also a refresher course for carb counting    # Diabetes complications include:  Retinopathy: last eye exam was 5 years ago, has a referral  Nephropathy: non creat: 0.84, no U micro alb  Neuropathy: none  LDL:104 in 2/2022  Smoking: none  BP: well controlled   Had  a foot exam in 2/2022 which was normal     - RTC in 3-4 months    The patient was seen, examined and discussed with MD Gloria Back MD.  Endocrinology fellow    Attending addendum:  Pt was evaluated with endocrinology fellow & plan is as outlined in this note. Recommended higher protein diet given hyperglycemia after lifting or consider change of 1:15 to 1:10 gms CHO at meal prior to weight lifting. Higher protein associated with lower frequency of post-prandial hyperglycemic  excursions.  For medical necessity reasons, he needs to have Lantus & Humalog insulin, since he had erratic unpredictable serious hypoglycemia when taking basaglar and novolog insulins.  Dr. Lorena Brown MD, MPH  Endocrinologist      Total Time: 60 min spent on chart review, evaluation, management, counseling, & education on the day of encounter    Answers for HPI/ROS submitted by the patient on 3/2/2022  General Symptoms: No  Skin Symptoms: No  HENT Symptoms: No  EYE SYMPTOMS: No  HEART SYMPTOMS: No  LUNG SYMPTOMS: No  INTESTINAL SYMPTOMS: No  URINARY SYMPTOMS: No  REPRODUCTIVE SYMPTOMS: No  SKELETAL SYMPTOMS: No  BLOOD SYMPTOMS: No  NERVOUS SYSTEM SYMPTOMS: No  MENTAL HEALTH SYMPTOMS: No

## 2022-03-07 ENCOUNTER — TELEPHONE (OUTPATIENT)
Dept: ENDOCRINOLOGY | Facility: CLINIC | Age: 32
End: 2022-03-07

## 2022-03-07 ENCOUNTER — TELEPHONE (OUTPATIENT)
Dept: ENDOCRINOLOGY | Facility: CLINIC | Age: 32
End: 2022-03-07
Payer: COMMERCIAL

## 2022-03-07 RX ORDER — INSULIN LISPRO 100 [IU]/ML
INJECTION, SOLUTION INTRAVENOUS; SUBCUTANEOUS
Qty: 30 ML | Refills: 3 | Status: SHIPPED | OUTPATIENT
Start: 2022-03-07

## 2022-03-07 NOTE — TELEPHONE ENCOUNTER
insulin glargine (LANTUS PEN) 100 UNIT/ML pen      Last Written Prescription Date:  3-3-22  Last Fill Quantity: 30 ml,   # refills: 11  Last Office Visit : 3-3-22  Future Office visit:  6-20-22    Routing refill request to provider for review/approval because:  Insulin - refilled per clinic      Note:The patient called to inform that the pharmacy did not get his refills for the Lantus and the Humalog,.     He needs the Rxs resent to the pharmacy.

## 2022-03-07 NOTE — TELEPHONE ENCOUNTER
Prior Authorization Approval    Authorization Effective Date: 2/1/2022  Authorization Expiration Date: 3/3/2023  Medication: Humalog - PA Approved  Approved Dose/Quantity: 1 month  Reference #: CMM KEY [id number]   Insurance Company: [employer] - Phone [phone] Fax [phone]  Expected CoPay:       CoPay Card Available:      Foundation Assistance Needed:    Which Pharmacy is filling the prescription (Not needed for infusion/clinic administered):    Pharmacy Notified:    Patient Notified:

## 2022-03-07 NOTE — TELEPHONE ENCOUNTER
Prior Authorization Approval    Authorization Effective Date: 2/1/2022  Authorization Expiration Date: 3/2/2025  Medication: Freestyle Vee - PA Approved  Approved Dose/Quantity: 1 month  Reference #: CMM KEY [id number]   Insurance Company: [employer] - Phone [phone] Fax [phone]  Expected CoPay:       CoPay Card Available:      Foundation Assistance Needed:    Which Pharmacy is filling the prescription (Not needed for infusion/clinic administered):    Pharmacy Notified:    Patient Notified:

## 2022-03-07 NOTE — TELEPHONE ENCOUNTER
Central Prior Authorization Team   Phone: [phone]      [facility] #[id number] [place] [id number] AT [facility] [address]    Patient forwarded call, he spoke with he pharmacy above and they did not get the refill sent for the patient's HUMALOG KWIKPEN.  Routing to the pool processing.    Tal SILVA team

## 2022-03-07 NOTE — TELEPHONE ENCOUNTER
"Central Prior Authorization Team   Phone: [phone]      Patient forwarded call to  Central PA Team department request new Rx for pen needle, diabetic (BD ULTRA-FINE KATRIN PEN NEEDLE) 32 gauge x 5/32\" Ndle to be sent to the pharmacy below.  I let him know that I would route it the care team pool for processing.       [facility] #[id number] - [facility] [address] AT [facility] [address]  "

## 2022-03-07 NOTE — TELEPHONE ENCOUNTER
Prior Authorization Approval    Authorization Effective Date: 2/1/2022  Authorization Expiration Date: 3/3/2023  Medication: Lantus - PA Approved  Approved Dose/Quantity: 1 month  Reference #: CMM KEY [id number]   Insurance Company: [employer] - Phone [phone] Fax [phone]  Expected CoPay:       CoPay Card Available:      Foundation Assistance Needed:    Which Pharmacy is filling the prescription (Not needed for infusion/clinic administered):    Pharmacy Notified:    Patient Notified:

## 2022-03-07 NOTE — TELEPHONE ENCOUNTER
Central Prior Authorization Team   Phone: [phone]        [facility] #[id number] [place] [address] AT [facility] [address]     Patient forwarded call, he spoke with he pharmacy above and they did not get the refill sent for the patient's LANTUS PEN.  Routing to the pool processing.     Tal SILVA team

## 2022-03-30 ENCOUNTER — ALLIED HEALTH/NURSE VISIT (OUTPATIENT)
Dept: EDUCATION SERVICES | Facility: CLINIC | Age: 32
End: 2022-03-30
Payer: COMMERCIAL

## 2022-03-30 DIAGNOSIS — E10.9 TYPE 1 DIABETES MELLITUS WITHOUT COMPLICATION (H): ICD-10-CM

## 2022-03-30 PROCEDURE — G0108 DIAB MANAGE TRN  PER INDIV: HCPCS

## 2022-03-30 RX ORDER — PROCHLORPERAZINE 25 MG/1
1 SUPPOSITORY RECTAL
Qty: 1 EACH | Refills: 3 | Status: SHIPPED | OUTPATIENT
Start: 2022-03-30 | End: 2022-08-01 | Stop reason: ALTCHOICE

## 2022-03-30 RX ORDER — PROCHLORPERAZINE 25 MG/1
1 SUPPOSITORY RECTAL
Qty: 9 EACH | Refills: 3 | Status: SHIPPED | OUTPATIENT
Start: 2022-03-30 | End: 2022-08-01 | Stop reason: ALTCHOICE

## 2022-03-30 NOTE — PROGRESS NOTES
"Diabetes Self-Management Education & Support    Presents for:      SUBJECTIVE/OBJECTIVE:  Diabetes education in the past 24mo: No  Diabetes type: Type 1  Disease course: Stable  How confident are you filling out medical forms by yourself:: Quite a bit  Diabetes management related comments/concerns: Want to learn more about the Lib[employer]  Cultural Influences/Ethnic Background:  Not  or       Diabetes Symptoms & Complications:  Fatigue: No  Neuropathy: No  Polydipsia: No  Polyphagia: No  Polyuria: No  Visual change: No  Slow healing wounds: No  Autonomic neuropathy: No  CVA: No  Heart disease: No  Nephropathy: No  Peripheral neuropathy: No  Peripheral Vascular Disease: No  Retinopathy: No  Sexual dysfunction: No    Patient Problem List and Family Medical History reviewed for relevant medical history, current medical status, and diabetes risk factors.    Vitals:  There were no vitals taken for this visit.  Estimated body mass index is 27.84 kg/m  as calculated from the following:    Height as of 3/3/22: 1.803 m (5' 11\").    Weight as of 3/3/22: 90.5 kg (199 lb 9.6 oz).   Last 3 BP:   BP Readings from Last 3 Encounters:   03/03/22 123/80   02/14/22 110/82       History   Smoking Status     Never Smoker   Smokeless Tobacco     Never Used       Labs:  Lab Results   Component Value Date    A1C 6.6 02/14/2022     Lab Results   Component Value Date     02/14/2022     Lab Results   Component Value Date     02/14/2022     Direct Measure HDL   Date Value Ref Range Status   02/14/2022 65 >=40 mg/dL Final     Comment:     HDL Cholesterol Reference Range:     0-2 years:   No reference ranges established for patients under 2 years old  at [facility] for lipid analytes.    2-8 years:  Greater than 45 mg/dL     18 years and older:   Female: Greater than or equal to 50 mg/dL   Male:   Greater than or equal to 40 mg/dL   ]  GFR Estimate   Date Value Ref Range Status   02/14/2022 >90 >60 " mL/min/1.73m2 Final     Comment:     Effective December 21, 2021 eGFRcr in adults is calculated using the 2021 CKD-EPI creatinine equation which includes age and gender (Pierre et al., NE, DOI: 10.1056/FFJSgf9584441)   07/09/2018 >60 >60 mL/min/1.73m2 Final     GFR Estimate If Black   Date Value Ref Range Status   07/09/2018 >60 >60 mL/min/1.73m2 Final     Lab Results   Component Value Date    CR 0.94 02/14/2022     No results found for: MICROALBUMIN    Healthy Eating:  Cultural/Cheondoism diet restrictions?: No  Meal planning/habits: Carb counting  How many times a week on average do you eat food made away from home (restaurant/take-out)?: 1  Meals include: Breakfast, Lunch, Dinner, Evening Snack  Beverages: Water, Coffee, Milk, Juice, Sports drinks, Alcohol    Being Active:  Days per week of moderate to strenuous exercise (like a brisk walk): (P) 5  On average, minutes per day of exercise at this level: (P) 30  How intense was your typical exercise? : (P) Heavy (like jogging or swimming  Exercise Minutes per Week: (P) 150  Barrier to exercise: None    Monitoring:  Blood Glucose Meter: One Touch  Times checking blood sugar at home (number): 5+  Times checking blood sugar at home (per): Day  Blood glucose trend: Fluctuating  Lab Results   Component Value Date    A1C 6.6 02/14/2022    A1C 6.5 07/09/2018    A1C 6.7 01/04/2018     Taking Medications:  Diabetes Medication(s)     Insulin       insulin glargine (LANTUS PEN) 100 UNIT/ML pen    Inject 17 Units Subcutaneous At Bedtime     insulin lispro (HUMALOG KWIKPEN) 100 UNIT/ML (1 unit dial) KWIKPEN    Use 1U per 15 gms CHO coverage and humalog 1 U: 50 mg/dl above 150, total of 30 units daily        Current Treatments: Diet, Insulin Injections    Reducing Risks:  CAD Risks: Family history  Has dilated eye exam at least once a year?: No  Sees dentist every 6 months?: Yes  Feet checked by healthcare provider in the last year?: Yes    Healthy Coping:  Informal Support  system:: Family, Friends, Significant other  Patient Activation Measure Survey Score:  No flowsheet data found.    Diabetes knowledge and skills assessment:   Patient is knowledgeable in diabetes management concepts related to: Healthy Eating, Being Active, Monitoring, Taking Medication, Problem Solving, Reducing Risks and Healthy Coping    Patient needs further education on the following diabetes management concepts: navigating sensor use    Based on learning assessment above, most appropriate setting for further diabetes education would be: Individual setting.    INTERVENTIONS:  We discussed cgm in general, both the Vee and the DexCom.  He has noticed a variance between his sensor and his meter.  He is interested in a DexCom just due to improved accuracy.  Order sent to pharmacy to check cost.    He will have new insurance soon.  He will reach out to our sensor reps to see if he can get some insight into which plan covers cgm the best.    Suggested he might do better with Tresiba.  He's had a bad experience with switching insulin to the point that he had paid out of pocket to get his insulin from [facility].  He will research it.      PLAN  See Patient Instructions for co-developed, patient-stated behavior change goals.  AVS printed and provided to patient today. See Follow-Up section for recommended follow-up.  Time Spent: 60 minutes  Encounter Type: Individual    Any diabetes medication dose changes were made via the CDE Protocol and Collaborative Practice Agreement with the patient's referring provider. A copy of this encounter was shared with the provider.

## 2022-03-30 NOTE — PATIENT INSTRUCTIONS
Parts of your Vee Sensor        Sensor and reader.                                     Cell phone tiffany and sensor.    1. The sensor is a white disc that you typically wear on the back of your upper arm.  2. The reader is a device to scan the meter with.  3. An tiffany for your cell phone may take the place of the reader.  ([employer])      1. Plan to wear your Vee sensor for 14-days.  The reader/tiffany will tell you when your sensor session is ending.  A one month supply is two sensors.    2. There is a one-hour warm up period each time you insert a new sensor.  You will not get any glucose data during this time.    3. Please scan your sensor at least every 8 hours.  Before breakfast, lunch, dinner and at bedtime.  The sensor updates every minute and can only hold 8 hours of blood glucose data.  If you go longer than 8 hours without scanning we will lose data.  When you scan it, you are emptying it out to make room for more data.      4. When your glucose result is on the screen of the reader you will see a pencil in the upper right corner of the screen.  If you press the pencil it will take you to a screen where you can check boxes for insulin and or food.  This will allow your healthcare team to see when the food and/or insulin go in.     5. Tiffany users can hook to our clinic portal using the code: [id number].  You cannot hook to our portal if you use a reader but you can upload your reader to [employer].    6. If you have any issues with keeping the Vee sensor on, please let us know.  There are several products that can be used to help it stay in place. This tends to be more of an issue during the summer months.     7.  If you have a skin reaction related to the Vee tape, let us know.  There are some products that can be used under that can reduce the reaction.    8.  Important:  The Vee sensor is reading the glucose in the interstitial fluid.  Your blood glucose meter is reading the glucose in the  "bloodstream.  These two values often mirror each other.  However, after you eat glucose goes to your bloodstream first and then takes an additional 20-30 minutes to get out to the interstitial fluid where the sensor can read it.  This means after treating a low (with food) it will take the sensor longer to recognize that you have recovered.  Do not eat until the sensor value comes back up, this will cause you to rebound high.  Do a fingerstick 15 minutes after treating a low to check recovery.    9. Compression lows: If you lay on the sensor you may prevent the interstitial fluid from getting to the sensor.  The sensor may read this as a low blood sugar.  The glucose will typically come right back up if you reposition yourself.  If you are unsure if you are low, do a fingerstick.    10. You will get some notice when your sensor session is ending.  There will be messages that give you a countdown until your sensor ends.  When you see the \"change sensor now\" message remove the old sensor.  It peels off like a Band-Aid and insert a new sensor.      11. Vee sensors are typically covered by your pharmacy benefit.  There are often coupons available on-line for people who do not have coverage.  Medicare covers them as durable medical equipment and they must be ordered through a company who can bill that way.  [facility] or [employer] or [employer] are examples of companies who can bill this way.    Deshawn Hicks Dexcom [phone]  Leanna Marshall  [phone]    Kathryn Blount RN,CDES  [address] [address]  Phone: [phone]  [email or web address]    "

## 2022-07-25 NOTE — PROGRESS NOTES
"Outcome for 07/25/22 5:51 PM: Data uploaded on [employer]  NATALIE Pozo     Start time:   End time:     HPI:   Ryan is a 30 yo man here for follow up of type 1 diabetes, diagnosed at 15 mos of age.  He usually sees Dr. Santiago.  Today is the first time I am meeting him. He reports that he has well controlled diabetes and has no complications. He does have a history of severe hypoglycemia when he was switched to basaglar insulin.  No issues other than this. He is quite physically active and is able to manage his glucose well, even with activity.  He does something physical each day, running, golfing, etc.  He has not been to the gym with COVID, but plans to return sometime soon.  He works as a chiropractor (off Mondays).   On the days that he doesn't have many breaks, he has more gatorade to prevent lows.     He recently started wearing a [employer] sensor and likes the ability to see his trends.  He checks his glucose continuously with the sensor.  He has noticed a regular climb in his glucose every morning despite pre-bolusing 30 minutes before he eats.  If he is going up, he can go for a run and it comes down quickly.      His current regimen is as follows:   Lantus- 17 units.   Humalog- 1/15g plus correction 1/50 over 150 mg/dL.     Breakfast- bowl of oatmeal squares and glass of milk. If he doesn't dose ahead of time, he will climb. He has to get his glucose into the \"good range before he eats.\"                    Diabetes Type: Type 1  Diabetes Duration: since age 15 mos.   Diabetes Control:   Lab Results   Component Value Date    A1C 6.6 02/14/2022    A1C 6.5 07/09/2018    A1C 6.7 01/04/2018       ROS  GENERAL: no weight loss, weight gain, fevers, chills, malaise, night sweats.   HEENT: no dysphagia, diplopia, neck pain or tenderness, dry/scratchy eyes, URI, cough, sinus drainage, tinnitus, sinus pressure  CV: no chest pain, pressure, palpitations, skipped beats, LOC  LUNGS: no SOB, FRY, cough, sputum " "production, wheezing   GI: no diarrhea, constipation, abdominal pain  EXTREMITIES: no rashes, ulcers, edema  NEUROLOGY: no changes in vision, tingling or numbness in hands or feet.   MSK: no muscle aches or pains, weakness  PSYCH: mood stable    Past Medical History:   Diagnosis Date     Type 1 diabetes (H)        No past surgical history on file.    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   Socioeconomic History     Marital status: Single     Spouse name: None     Number of children: None     Years of education: None     Highest education level: None   Tobacco Use     Smoking status: Never Smoker     Smokeless tobacco: Never Used   Substance and Sexual Activity     Alcohol use: Yes     Comment: 2 / week       Current Outpatient Medications   Medication     blood glucose (NO BRAND SPECIFIED) test strip     blood glucose meter (GLUCOMETER)     Continuous Blood Gluc Sensor (FREESTYLE CAPRICE 2 SENSOR) MISC     insulin glargine (LANTUS PEN) 100 UNIT/ML pen     insulin lispro (HUMALOG KWIKPEN) 100 UNIT/ML (1 unit dial) KWIKPEN     insulin pen needle (32G X 4 MM) 32G X 4 MM miscellaneous     pen needle, diabetic (BD ULTRA-FINE KATRIN PEN NEEDLE) 32 gauge x 5/32\" Ndle     Continuous Blood Gluc Sensor (DEXCOM G6 SENSOR) MISC     Continuous Blood Gluc Transmit (DEXCOM G6 TRANSMITTER) MISC     No current facility-administered medications for this visit.        No Known Allergies    Physical Exam  There were no vitals taken for this visit.  GENERAL: healthy, alert and no distress  RESP: no audible wheeze, cough, or visible cyanosis.  No visible retractions or increased work of breathing.  Able to speak fully in complete sentences.  PSYCH: mentation appears normal, affect normal/bright, judgement and insight intact, normal speech and appearance well-groomed    RESULTS  Lab " Results   Component Value Date    A1C 6.6 (H) 02/14/2022    A1C 6.5 (H) 07/09/2018    A1C 6.7 (H) 01/04/2018       TSH   Date Value Ref Range Status   01/04/2018 1.52 0.30 - 5.00 uIU/mL Final       ALT   Date Value Ref Range Status   02/14/2022 31 0 - 45 U/L Final   ]    Recent Labs   Lab Test 02/14/22  1205 07/09/18  0757   CHOL 177 154   HDL 65 55    86   TRIG 42 63       Lab Results   Component Value Date     02/14/2022      Lab Results   Component Value Date    POTASSIUM 4.7 02/14/2022     Lab Results   Component Value Date    CHLORIDE 104 02/14/2022     Lab Results   Component Value Date    NOE 9.8 02/14/2022     Lab Results   Component Value Date    CO2 28 02/14/2022     Lab Results   Component Value Date    BUN 15 02/14/2022     Lab Results   Component Value Date    CR 0.94 02/14/2022       GFR Estimate   Date Value Ref Range Status   02/14/2022 >90 >60 mL/min/1.73m2 Final     Comment:     Effective December 21, 2021 eGFRcr in adults is calculated using the 2021 CKD-EPI creatinine equation which includes age and gender (Pierre et al., NEJ, DOI: 10.1056/TIWTic4445108)   07/09/2018 >60 >60 mL/min/1.73m2 Final   01/04/2018 >60 >60 mL/min/1.73m2 Final     GFR Estimate If Black   Date Value Ref Range Status   07/09/2018 >60 >60 mL/min/1.73m2 Final   01/04/2018 >60 >60 mL/min/1.73m2 Final       No results found for: MICROL  No results found for: MICROALBUMIN  No results found for: CPEPT, GADAB, ISCAB    No results found for: B12]    Most recent eye exam date: : Not Found       Assessment/Plan:     1.  Type 1 diabetes- Excellent control.  A1c has been in the 6% range.  We discussed some technologies which could be helpful, including the In-pen bluetooth insulin pen.  I will order this and he will download the summer/set it up if covered.  We made the following changes today (instructions given to patient):     In-pen.  Let me know if you receive this and I will order cartridges.      In-pen settings  recommendations:   Carb ratio 1/15g- may be closer to 1/13g  Correction factor- 6am-noon- 45, noon-6am- 50  Active insulin time- 3 hours.     Schedule eye exam.     Keep up the great work!    Please let me know if you are having low blood sugars less than 70 or over 250 mg/dL.      If you have concerns, please send me a [employer] message M-Th, call the clinic at [phone], or call [phone] after hours/weekends and ask to speak with the endocrinologist on call.      2.  Risk factors-     Retinopathy:  No.  Had eye exam within last year.   Nephropathy:  BP well controlled. No microalbuminuria.  Creatinine stable.   Neuropathy: No.    Feet: OK, no ulcers.   Lipids:  LDL at target.  Statin not indicated.  Thyroid screening: TSH normal   Celiac screening: will screen.     3.  F/U as planned with Dr. Santiago in 3 months, sooner with concerns.     45 minutes spent on the date of the encounter doing chart review, review of test results, review of continuous glucose sensor, interpretation of glucose data, patient visit and documentation, counseling/coordination of care, and discussion of follow up plan for worsening hyper and hypoglycemia.  The patient understood and is satisfied with today's visit.          Scarlett Weiner PA-C, MPAS   [facility]  Department of Medicine  Division of Endocrinology and Diabetes                                                        Answers for HPI/ROS submitted by the patient on 8/1/2022  General Symptoms: No  Skin Symptoms: No  HENT Symptoms: Yes  EYE SYMPTOMS: No  HEART SYMPTOMS: No  LUNG SYMPTOMS: Yes  INTESTINAL SYMPTOMS: No  URINARY SYMPTOMS: No  REPRODUCTIVE SYMPTOMS: No  SKELETAL SYMPTOMS: No  BLOOD SYMPTOMS: No  NERVOUS SYSTEM SYMPTOMS: No  MENTAL HEALTH SYMPTOMS: No  Ear pain: No  Ear discharge: No  Hearing loss: No  Tinnitus: No  Nosebleeds: No  Congestion: Yes  Sinus pain: No  Trouble swallowing: No   Voice hoarseness: No  Mouth sores: No  Sore throat:  No  Tooth pain: No  Gum tenderness: No  Bleeding gums: No  Change in taste: No  Change in sense of smell: No  Dry mouth: No  Hearing aid used: No  Neck lump: No  Cough: Yes  Sputum or phlegm: No  Coughing up blood: No  Difficulty breating or shortness of breath: No  Snoring: No  Wheezing: No  Difficulty breathing on exertion: No  Nighttime Cough: No  Difficulty breathing when lying flat: No

## 2022-08-01 ENCOUNTER — VIRTUAL VISIT (OUTPATIENT)
Dept: ENDOCRINOLOGY | Facility: CLINIC | Age: 32
End: 2022-08-01
Payer: COMMERCIAL

## 2022-08-01 DIAGNOSIS — E10.9 TYPE 1 DIABETES MELLITUS WITHOUT COMPLICATION (H): Primary | ICD-10-CM

## 2022-08-01 PROCEDURE — 99215 OFFICE O/P EST HI 40 MIN: CPT | Mod: 95 | Performed by: PHYSICIAN ASSISTANT

## 2022-08-01 RX ORDER — INSULIN PEN,REUSABLE,BT LISPRO
1 INSULIN PEN (EA) SUBCUTANEOUS 4 TIMES DAILY
Qty: 2 EACH | Refills: 3 | Status: SHIPPED | OUTPATIENT
Start: 2022-08-01

## 2022-08-01 ASSESSMENT — ENCOUNTER SYMPTOMS
SPUTUM PRODUCTION: 0
NECK MASS: 0
COUGH: 1
TASTE DISTURBANCE: 0
COUGH DISTURBING SLEEP: 0
SHORTNESS OF BREATH: 0
WHEEZING: 0
SNORES LOUDLY: 0
TROUBLE SWALLOWING: 0
SINUS PAIN: 0
HEMOPTYSIS: 0
POSTURAL DYSPNEA: 0
SINUS CONGESTION: 1
SMELL DISTURBANCE: 0
DYSPNEA ON EXERTION: 0
HOARSE VOICE: 0
SORE THROAT: 0

## 2022-08-01 NOTE — LETTER
"8/1/2022       RE: Ryan Garcia  [address]  [facility] [address]     Dear Colleague,    Thank you for referring your patient, Ryan Garcia, to the [facility] ENDOCRINOLOGY [facility]INIC [place] at [facility]. Please see a copy of my visit note below.    Outcome for 07/25/22 5:51 PM: Data uploaded on NATALIE Wasserman     Start time:   End time:     HPI:   Ryan is a 30 yo man here for follow up of type 1 diabetes, diagnosed at 15 mos of age.  He usually sees Dr. Santiago.  Today is the first time I am meeting him. He reports that he has well controlled diabetes and has no complications. He does have a history of severe hypoglycemia when he was switched to basaglar insulin.  No issues other than this. He is quite physically active and is able to manage his glucose well, even with activity.  He does something physical each day, running, golfing, etc.  He has not been to the gym with COVID, but plans to return sometime soon.  He works as a chiropractor (off Mondays).   On the days that he doesn't have many breaks, he has more gatorade to prevent lows.     He recently started wearing a carlos sensor and likes the ability to see his trends.  He checks his glucose continuously with the sensor.  He has noticed a regular climb in his glucose every morning despite pre-bolusing 30 minutes before he eats.  If he is going up, he can go for a run and it comes down quickly.      His current regimen is as follows:   Lantus- 17 units.   Humalog- 1/15g plus correction 1/50 over 150 mg/dL.     Breakfast- bowl of oatmeal squares and glass of milk. If he doesn't dose ahead of time, he will climb. He has to get his glucose into the \"good range before he eats.\"                    Diabetes Type: Type 1  Diabetes Duration: since age 15 mos.   Diabetes Control:   Lab Results   Component Value Date    A1C 6.6 02/14/2022    A1C 6.5 07/09/2018    A1C 6.7 " "01/04/2018       ROS  GENERAL: no weight loss, weight gain, fevers, chills, malaise, night sweats.   HEENT: no dysphagia, diplopia, neck pain or tenderness, dry/scratchy eyes, URI, cough, sinus drainage, tinnitus, sinus pressure  CV: no chest pain, pressure, palpitations, skipped beats, LOC  LUNGS: no SOB, FRY, cough, sputum production, wheezing   GI: no diarrhea, constipation, abdominal pain  EXTREMITIES: no rashes, ulcers, edema  NEUROLOGY: no changes in vision, tingling or numbness in hands or feet.   MSK: no muscle aches or pains, weakness  PSYCH: mood stable    Past Medical History:   Diagnosis Date     Type 1 diabetes (H)        No past surgical history on file.    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   Socioeconomic History     Marital status: Single     Spouse name: None     Number of children: None     Years of education: None     Highest education level: None   Tobacco Use     Smoking status: Never Smoker     Smokeless tobacco: Never Used   Substance and Sexual Activity     Alcohol use: Yes     Comment: 2 / week       Current Outpatient Medications   Medication     blood glucose (NO BRAND SPECIFIED) test strip     blood glucose meter (GLUCOMETER)     Continuous Blood Gluc Sensor (FREESTYLE CAPRICE 2 SENSOR) MISC     insulin glargine (LANTUS PEN) 100 UNIT/ML pen     insulin lispro (HUMALOG KWIKPEN) 100 UNIT/ML (1 unit dial) KWIKPEN     insulin pen needle (32G X 4 MM) 32G X 4 MM miscellaneous     pen needle, diabetic (BD ULTRA-FINE KATRIN PEN NEEDLE) 32 gauge x 5/32\" Ndle     Continuous Blood Gluc Sensor (DEXCOM G6 SENSOR) MISC     Continuous Blood Gluc Transmit (DEXCOM G6 TRANSMITTER) MISC     No current facility-administered medications for this visit.        No Known Allergies    Physical Exam  There were no vitals taken for this visit.  GENERAL: healthy, " alert and no distress  RESP: no audible wheeze, cough, or visible cyanosis.  No visible retractions or increased work of breathing.  Able to speak fully in complete sentences.  PSYCH: mentation appears normal, affect normal/bright, judgement and insight intact, normal speech and appearance well-groomed    RESULTS  Lab Results   Component Value Date    A1C 6.6 (H) 02/14/2022    A1C 6.5 (H) 07/09/2018    A1C 6.7 (H) 01/04/2018       TSH   Date Value Ref Range Status   01/04/2018 1.52 0.30 - 5.00 uIU/mL Final       ALT   Date Value Ref Range Status   02/14/2022 31 0 - 45 U/L Final   ]    Recent Labs   Lab Test 02/14/22  1205 07/09/18  0757   CHOL 177 154   HDL 65 55    86   TRIG 42 63       Lab Results   Component Value Date     02/14/2022      Lab Results   Component Value Date    POTASSIUM 4.7 02/14/2022     Lab Results   Component Value Date    CHLORIDE 104 02/14/2022     Lab Results   Component Value Date    NOE 9.8 02/14/2022     Lab Results   Component Value Date    CO2 28 02/14/2022     Lab Results   Component Value Date    BUN 15 02/14/2022     Lab Results   Component Value Date    CR 0.94 02/14/2022       GFR Estimate   Date Value Ref Range Status   02/14/2022 >90 >60 mL/min/1.73m2 Final     Comment:     Effective December 21, 2021 eGFRcr in adults is calculated using the 2021 CKD-EPI creatinine equation which includes age and gender (Pierre goodwin al., [place], DOI: 10.1056/XEHJjr8987736)   07/09/2018 >60 >60 mL/min/1.73m2 Final   01/04/2018 >60 >60 mL/min/1.73m2 Final     GFR Estimate If Black   Date Value Ref Range Status   07/09/2018 >60 >60 mL/min/1.73m2 Final   01/04/2018 >60 >60 mL/min/1.73m2 Final       No results found for: MICROL  No results found for: MICROALBUMIN  No results found for: CPEPT, GADAB, ISCAB    No results found for: B12]    Most recent eye exam date: : Not Found       Assessment/Plan:     1.  Type 1 diabetes- Excellent control.  A1c has been in the 6% range.  We discussed some  technologies which could be helpful, including the In-pen bluetooth insulin pen.  I will order this and he will download the summer/set it up if covered.  We made the following changes today (instructions given to patient):     In-pen.  Let me know if you receive this and I will order cartridges.      In-pen settings recommendations:   Carb ratio 1/15g- may be closer to 1/13g  Correction factor- 6am-noon- 45, noon-6am- 50  Active insulin time- 3 hours.     Schedule eye exam.     Keep up the great work!    Please let me know if you are having low blood sugars less than 70 or over 250 mg/dL.      If you have concerns, please send me a Red Aril message M-Th, call the clinic at [phone], or call [phone] after hours/weekends and ask to speak with the endocrinologist on call.      2.  Risk factors-     Retinopathy:  No.  Had eye exam within last year.   Nephropathy:  BP well controlled. No microalbuminuria.  Creatinine stable.   Neuropathy: No.    Feet: OK, no ulcers.   Lipids:  LDL at target.  Statin not indicated.  Thyroid screening: TSH normal   Celiac screening: will screen.     3.  F/U as planned with Dr. Santiago in 3 months, sooner with concerns.     45 minutes spent on the date of the encounter doing chart review, review of test results, review of continuous glucose sensor, interpretation of glucose data, patient visit and documentation, counseling/coordination of care, and discussion of follow up plan for worsening hyper and hypoglycemia.  The patient understood and is satisfied with today's visit.          Scarlett Weiner PA-C, MPAS   [facility]  Department of Medicine  Division of Endocrinology and Diabetes                                                        Answers for HPI/ROS submitted by the patient on 8/1/2022  General Symptoms: No  Skin Symptoms: No  HENT Symptoms: Yes  EYE SYMPTOMS: No  HEART SYMPTOMS: No  LUNG SYMPTOMS: Yes  INTESTINAL SYMPTOMS: No  URINARY SYMPTOMS:  No  REPRODUCTIVE SYMPTOMS: No  SKELETAL SYMPTOMS: No  BLOOD SYMPTOMS: No  NERVOUS SYSTEM SYMPTOMS: No  MENTAL HEALTH SYMPTOMS: No  Ear pain: No  Ear discharge: No  Hearing loss: No  Tinnitus: No  Nosebleeds: No  Congestion: Yes  Sinus pain: No  Trouble swallowing: No   Voice hoarseness: No  Mouth sores: No  Sore throat: No  Tooth pain: No  Gum tenderness: No  Bleeding gums: No  Change in taste: No  Change in sense of smell: No  Dry mouth: No  Hearing aid used: No  Neck lump: No  Cough: Yes  Sputum or phlegm: No  Coughing up blood: No  Difficulty breating or shortness of breath: No  Snoring: No  Wheezing: No  Difficulty breathing on exertion: No  Nighttime Cough: No  Difficulty breathing when lying flat: No        Davis EBONY Garcia  is being evaluated via a billable video visit.      How would you like to obtain your AVS? MyChart  For the video visit, send the invitation by: Text to cell phone: [phone]   Will anyone else be joining your video visit? No

## 2022-08-01 NOTE — NURSING NOTE
Patient denies any changes since echeck-in regarding medication and allergies.Patient also states all information entered during echeck-in remains accurate.    Patient request removal of Dexcom G^ Sensor     Patient states they are in [place] for today's virtual visit.     Mey Rich, [facility]

## 2022-08-01 NOTE — PROGRESS NOTES
Ryan Garcia  is being evaluated via a billable video visit.      How would you like to obtain your AVS? Red Guru  For the video visit, send the invitation by: Text to cell phone: [phone]   Will anyone else be joining your video visit? No

## 2022-08-01 NOTE — PATIENT INSTRUCTIONS
Nice meeting youRyan!      In-pen.  Let me know if you receive this and I will order cartridges.      In-pen settings recommendations:   Carb ratio 1/15g -may be closer to 1/13g  Correction factor- 6am-noon- 45, noon-6am- 50  Active insulin time- 3 hours.       Schedule eye exam.     Keep up the great work!    Please let me know if you are having low blood sugars less than 70 or over 250 mg/dL.      If you have concerns, please send me a Share[employer] message M-Th, call the clinic at [phone], or call [phone] after hours/weekends and ask to speak with the endocrinologist on call.

## 2022-08-02 DIAGNOSIS — E10.9 TYPE 1 DIABETES MELLITUS WITHOUT COMPLICATION (H): ICD-10-CM

## 2022-08-15 DIAGNOSIS — E10.9 TYPE 1 DIABETES MELLITUS WITHOUT COMPLICATION (H): ICD-10-CM

## 2022-10-10 ENCOUNTER — HEALTH MAINTENANCE LETTER (OUTPATIENT)
Age: 32
End: 2022-10-10

## 2023-01-02 NOTE — PROGRESS NOTES
Ryan BECK Hilreny  is being evaluated via a billable video visit.      Patient denies any changes since echeck-in regarding medication and allergies and states all information entered during echeck-in remains accurate.    How would you like to obtain your AVS? Statzup  For the video visit, send the invitation by: Text to cell phone: [phone]  Will anyone else be joining your video visit? No        Outcome for 01/02/23 4:53 PM: Marakana message sent  Outcome for 01/02/23 4:53 PM: Data uploaded on Q-go  Brooklyn Herrera, NATALIE  Outcome for 01/05/23 10:16 AM: Left Voicemail   Tonia Medina, VF  Outcome for 01/05/23 3:36 PM: Replied to Marakana message  NATALIE Lott  Outcome for 01/06/23 9:35 AM: Data uploaded on Q-go. Pt states he is not using Inpen.   Tonia Medina, NATALIE         Start time: 0702  End time: 0733    HPI:   Ryan is a 33 yo man here for follow up of type 1 diabetes, diagnosed at 15 mos of age.  He also sees Dr. Santiago.  He has well controlled diabetes and has had no complications. He does have a history of severe hypoglycemia when he was switched to basaglar insulin.  No issues other than this. He is quite physically active and is able to manage his glucose well, even with activity.  He does something physical each day, running (although less in the winter as he prefers to run outside), weight lifting at the gy,, etc. He finds that if he is not doing any cardio, blood sugars go up significantly at the gym.   He takes a dose of insulin before he goes in order to prevent this.  He works as a chiropractor (off Mondays).     His current regimen is as follows:   Lantus- 18 units.   Humalog- 1/15g plus correction 1/50 over 150 mg/dL.   He regularly pre-boluses before eating.  If he does not, his glucose will spike. Even when he does pre-bolus, he finds he has to add extra insulin in the morning to prevent highs.    We discussed using the In-pen at his last visit and he would like to  proceed.  He received the IN-pen, but never received the insulin cartridges to go in it.  He has been resistant to consider pumps in the past (bad experience with [employer] years ago), but may be interested in learning more now.     Ryan wears a carlos 2 sensor and has liked the ability to see his trends.  He checks his glucose continuously with the sensor. His overall average glucose over the past 2 weeks is 139 mg/dL (CV 31%, TIR 83%).                 Diabetes Type: Type 1  Diabetes Duration: since age 15 mos.   Diabetes Control:   Lab Results   Component Value Date    A1C 6.6 02/14/2022    A1C 6.5 07/09/2018    A1C 6.7 01/04/2018       ROS  GENERAL: no weight loss, weight gain, fevers, chills, malaise, night sweats.   HEENT: no dysphagia, diplopia, neck pain or tenderness, dry/scratchy eyes, URI, cough, sinus drainage, tinnitus, sinus pressure  CV: no chest pain, pressure, palpitations, skipped beats, LOC  LUNGS: no SOB, FRY, cough, sputum production, wheezing   GI: no diarrhea, constipation, abdominal pain  EXTREMITIES: no rashes, ulcers, edema  NEUROLOGY: no changes in vision, tingling or numbness in hands or feet.   MSK: no muscle aches or pains, weakness  PSYCH: mood stable    Past Medical History:   Diagnosis Date     Type 1 diabetes (H)        No past surgical history on file.    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 Enjoys going to the gym/running.     Socioeconomic History     Marital status: Single     Spouse name: None     Number of children: None     Years of education: None     Highest education level: None   Tobacco Use     Smoking status: Never Smoker     Smokeless tobacco: Never Used   Substance and Sexual Activity     Alcohol use: Yes     Comment: 2 / week       Current Outpatient Medications   Medication     blood glucose (NO BRAND SPECIFIED)  "test strip     blood glucose meter (GLUCOMETER)     Continuous Blood Gluc Sensor (FREESTYLE CAPRICE 2 SENSOR) [facility]     Injection Device for insulin (INPEN 100-GREY-JUANA-HUMALOG) LILIAN     insulin glargine (LANTUS PEN) 100 UNIT/ML pen     insulin lispro (HUMALOG KWIKPEN) 100 UNIT/ML (1 unit dial) KWIKPEN     insulin pen needle (32G X 4 MM) 32G X 4 MM miscellaneous     pen needle, diabetic (BD ULTRA-FINE KATRIN PEN NEEDLE) 32 gauge x 5/32\" Ndle     No current facility-administered medications for this visit.        No Known Allergies    Physical Exam  There were no vitals taken for this visit.  GENERAL: healthy, alert and no distress  RESP: no audible wheeze, cough, or visible cyanosis.  No visible retractions or increased work of breathing.  Able to speak fully in complete sentences.  PSYCH: mentation appears normal, affect normal/bright, judgement and insight intact, normal speech and appearance well-groomed  RESULTS  Lab Results   Component Value Date    A1C 6.6 (H) 02/14/2022    A1C 6.5 (H) 07/09/2018    A1C 6.7 (H) 01/04/2018       TSH   Date Value Ref Range Status   01/04/2018 1.52 0.30 - 5.00 uIU/mL Final       ALT   Date Value Ref Range Status   02/14/2022 31 0 - 45 U/L Final   ]    Recent Labs   Lab Test 02/14/22  1205 07/09/18  0757   CHOL 177 154   HDL 65 55    86   TRIG 42 63       Lab Results   Component Value Date     02/14/2022      Lab Results   Component Value Date    POTASSIUM 4.7 02/14/2022     Lab Results   Component Value Date    CHLORIDE 104 02/14/2022     Lab Results   Component Value Date    NOE 9.8 02/14/2022     Lab Results   Component Value Date    CO2 28 02/14/2022     Lab Results   Component Value Date    BUN 15 02/14/2022     Lab Results   Component Value Date    CR 0.94 02/14/2022       GFR Estimate   Date Value Ref Range Status   02/14/2022 >90 >60 mL/min/1.73m2 Final     Comment:     Effective December 21, 2021 eGFRcr in adults is calculated using the 2021 CKD-EPI creatinine " equation which includes age and gender (Pierre goodwin al., [facility], DOI: 10.1056/MQGXhg9127786)   07/09/2018 >60 >60 mL/min/1.73m2 Final   01/04/2018 >60 >60 mL/min/1.73m2 Final     GFR Estimate If Black   Date Value Ref Range Status   07/09/2018 >60 >60 mL/min/1.73m2 Final   01/04/2018 >60 >60 mL/min/1.73m2 Final       No results found for: MICROL  No results found for: MICROALBUMIN  No results found for: CPEPT, GADAB, ISCAB    No results found for: B12]    Most recent eye exam date: : Not Found     Assessment/Plan:     1.  Type 1 diabetes- Excellent control.  A1c has been in the 6% range.  Will check a1c. He would like to proceed with starting the In-pen bluetooth insulin pen.  Could also benefit from the Omnipod 5 hybrid closed loop pump in the future (if he prefers tubeless) or Tandem X2 pump.  Will also switch to Dexcom G6 sensor so that it can pair with the In-pen.  Will review options with DM education.  His glucose is a bit high after breakfast, so we made the following changes today (instructions given to patient):     In pen settings:   Carb ratio:   Mid- 1/15g  5am- 1/12g  11am- 1/15g    Correction factor: 50     Active insulin: 3 hours    Try working on doing a 10-15 minute walk/jog prior to workout.      Schedule in diabetes education to follow up on In-pen and also discuss Omnipod 5.   I have prescribed dexcom g6.  If not covered, we will plan to switch to Vee 3.     Schedule eye exam.     Schedule labs.     Emergency issues: Please contact the clinic as soon as you recognize a problem.  Here are some concerns you should contact us about.  -Vomiting: more than twice.  Please check ketones.  If positive, go to ER. Monitor glucose hourly.   -High glucose (over 300 mg/dL twice in a row): Please check ketones.  If ketones are negative, take an insulin correction and recheck glucose in 1 hour.  If glucose is not coming down, please call the clinic. If ketones are moderate or large, drink lots of water, take an  insulin correction, and recheck ketones in 1 hour.  If ketones are still high (or you are vomiting), go to the ER.  -Hypoglycemia (low glucose):   If glucose is less than 70 mg/dL, treat with 15g carb (4 glucose tablets), recheck glucose in 10 minutes.  If low again, repeat.   If glucose is less than 54 mg/dL, treat with 30g carb, recheck glucose in 10 minutes.  If low again, repeat.  Keep glucagon in your home in case of severe hypoglycemia and train someone how to use this.    Emergency kit (please ensure you always have these with you):   Glucose tablets  Glucagon  Insulin  Syringes (if on a pump)  Extra infusion set (if on a pump)  Ketone strips    Contact information:   If you have concerns, please send me a [employer] message or call the clinic at [phone].  For more urgent concerns, please call [phone] after hours/weekends and ask to speak with the endocrinologist on call.      Please let me know if you are having low blood sugars less than 70 or over 350 mg/dL.  Do not wait until your next appointment if this is happening.      2.  Risk factors-     Retinopathy:  No. Due for eye exam.  Will schedule.   Nephropathy:  BP historically well controlled. No microalbuminuria.  Creatinine stable.   Neuropathy: No.    Feet: OK, no ulcers.   Lipids:  LDL at target.  Statin not indicated.  Thyroid screening: TSH normal 2018.  Will recheck.   Celiac screening: will screen antibodies.     3.  F/U in 3 mos with Dr. Santiago or myself, sooner with concerns.     42 minutes spent on the date of the encounter doing chart review, review of test results, review of continuous glucose sensor, interpretation of glucose data, patient visit and documentation, counseling/coordination of care, and discussion of follow up plan for worsening hyper and hypoglycemia.  The patient understood and is satisfied with today's visit.       Scarlett Weiner PA-C, MPAS   [facility]  Department of Medicine  Division of  Endocrinology and Diabetes

## 2023-01-05 ENCOUNTER — TELEPHONE (OUTPATIENT)
Dept: ENDOCRINOLOGY | Facility: CLINIC | Age: 33
End: 2023-01-05

## 2023-01-05 NOTE — TELEPHONE ENCOUNTER
Called patient and left voicemail. Patient has an appointment on 1/9/23. Need patient to upload their Inpen device to site for provider to review prior to their appointment. Tonia Medina on 1/5/2023 at 10:18 AM

## 2023-01-09 ENCOUNTER — VIRTUAL VISIT (OUTPATIENT)
Dept: ENDOCRINOLOGY | Facility: CLINIC | Age: 33
End: 2023-01-09
Payer: COMMERCIAL

## 2023-01-09 DIAGNOSIS — E10.9 TYPE 1 DIABETES MELLITUS WITHOUT COMPLICATION (H): ICD-10-CM

## 2023-01-09 PROCEDURE — 99215 OFFICE O/P EST HI 40 MIN: CPT | Mod: 95 | Performed by: PHYSICIAN ASSISTANT

## 2023-01-09 RX ORDER — GLUCAGON 3 MG/1
1 POWDER NASAL PRN
Qty: 2 EACH | Refills: 3 | Status: SHIPPED | OUTPATIENT
Start: 2023-01-09

## 2023-01-09 RX ORDER — PROCHLORPERAZINE 25 MG/1
SUPPOSITORY RECTAL
Qty: 3 EACH | Refills: 11 | Status: SHIPPED | OUTPATIENT
Start: 2023-01-09

## 2023-01-09 RX ORDER — URINE ACETONE TEST STRIPS
STRIP MISCELLANEOUS
Qty: 50 STRIP | Refills: 3 | Status: SHIPPED | OUTPATIENT
Start: 2023-01-09

## 2023-01-09 RX ORDER — PROCHLORPERAZINE 25 MG/1
SUPPOSITORY RECTAL
Qty: 1 EACH | Refills: 3 | Status: SHIPPED | OUTPATIENT
Start: 2023-01-09 | End: 2023-07-24 | Stop reason: ALTCHOICE

## 2023-01-09 NOTE — PATIENT INSTRUCTIONS
In pen settings:   Carb ratio:   Mid- 1/15g  5am- 1/12g  11am- 1/15g    Correction factor: 50     Active insulin: 3 hours    Try working on doing a 10-15 minute walk/jog prior to workout.      Schedule with Buddy Paris to set up In-pen  I have prescribed dexcom g6.  If not covered, we will plan to switch to Vee 3.     Schedule in diabetes education to follow up on In-pen and also discuss Omnipod 5.     Schedule eye exam.     Schedule labs.     Emergency issues: Please contact the clinic as soon as you recognize a problem.  Here are some concerns you should contact us about.  -Vomiting: more than twice.  Please check ketones.  If positive, go to ER. Monitor glucose hourly.   -High glucose (over 300 mg/dL twice in a row): Please check ketones.  If ketones are negative, take an insulin correction and recheck glucose in 1 hour.  If glucose is not coming down, please call the clinic. If ketones are moderate or large, drink lots of water, take an insulin correction, and recheck ketones in 1 hour.  If ketones are still high (or you are vomiting), go to the ER.  -Hypoglycemia (low glucose):   If glucose is less than 70 mg/dL, treat with 15g carb (4 glucose tablets), recheck glucose in 10 minutes.  If low again, repeat.   If glucose is less than 54 mg/dL, treat with 30g carb, recheck glucose in 10 minutes.  If low again, repeat.  Keep glucagon in your home in case of severe hypoglycemia and train someone how to use this.    Emergency kit (please ensure you always have these with you):   Glucose tablets  Glucagon  Insulin  Syringes (if on a pump)  Extra infusion set (if on a pump)  Ketone strips    Contact information:   If you have concerns, please send me a [employer] message or call the clinic at [phone].  For more urgent concerns, please call [phone] after hours/weekends and ask to speak with the endocrinologist on call.      Please let me know if you are having low blood sugars less than 70 or over 350 mg/dL.   Do not wait until your next appointment if this is happening.

## 2023-01-09 NOTE — Clinical Note
JERONIMO Davis will be seeing you soon to set up the In-pen and I also prescribed the dexcom so it pairs with the inpen. Is interested to learn about new pump options, too.  Thanks! Scarlett

## 2023-01-09 NOTE — LETTER
1/9/2023       RE: Ryan Garcia  [address]  [facility] [address]     Dear Colleague,    Thank you for referring your patient, Ryan Garcia, to the [facility] ENDOCRINOLOGY CLINIC [place] at [facility]. Please see a copy of my visit note below.    Ryan Garcia  is being evaluated via a billable video visit.      Patient denies any changes since echeck-in regarding medication and allergies and states all information entered during echeck-in remains accurate.    How would you like to obtain your AVS? [email or web address]  For the video visit, send the invitation by: Text to cell phone: [phone]  Will anyone else be joining your video visit? No        Outcome for 01/02/23 4:53 PM: [employer] message sent  Outcome for 01/02/23 4:53 PM: Data uploaded on [employer]  Brooklyn Herrera, NATALIE  Outcome for 01/05/23 10:16 AM: Left Voicemail   NATALIE Lott  Outcome for 01/05/23 3:36 PM: Replied to [employer] message  NATALIE Lott  Outcome for 01/06/23 9:35 AM: Data uploaded on [employer]. Pt states he is not using Inpen.   NATALIE Lott         Start time: 0702  End time: 0733    HPI:   Ryan is a 31 yo man here for follow up of type 1 diabetes, diagnosed at 15 mos of age.  He also sees Dr. Santiago.  He has well controlled diabetes and has had no complications. He does have a history of severe hypoglycemia when he was switched to basaglar insulin.  No issues other than this. He is quite physically active and is able to manage his glucose well, even with activity.  He does something physical each day, running (although less in the winter as he prefers to run outside), weight lifting at the gy,, etc. He finds that if he is not doing any cardio, blood sugars go up significantly at the gym.   He takes a dose of insulin before he goes in order to prevent this.  He works as a chiropractor (off Mondays).     His current regimen is as follows:    Lantus- 18 units.   Humalog- 1/15g plus correction 1/50 over 150 mg/dL.   He regularly pre-boluses before eating.  If he does not, his glucose will spike. Even when he does pre-bolus, he finds he has to add extra insulin in the morning to prevent highs.    We discussed using the In-pen at his last visit and he would like to proceed.  He received the IN-pen, but never received the insulin cartridges to go in it.  He has been resistant to consider pumps in the past (bad experience with [employer] years ago), but may be interested in learning more now.     Ryan wears a carlos 2 sensor and has liked the ability to see his trends.  He checks his glucose continuously with the sensor. His overall average glucose over the past 2 weeks is 139 mg/dL (CV 31%, TIR 83%).                 Diabetes Type: Type 1  Diabetes Duration: since age 15 mos.   Diabetes Control:   Lab Results   Component Value Date    A1C 6.6 02/14/2022    A1C 6.5 07/09/2018    A1C 6.7 01/04/2018       ROS  GENERAL: no weight loss, weight gain, fevers, chills, malaise, night sweats.   HEENT: no dysphagia, diplopia, neck pain or tenderness, dry/scratchy eyes, URI, cough, sinus drainage, tinnitus, sinus pressure  CV: no chest pain, pressure, palpitations, skipped beats, LOC  LUNGS: no SOB, FRY, cough, sputum production, wheezing   GI: no diarrhea, constipation, abdominal pain  EXTREMITIES: no rashes, ulcers, edema  NEUROLOGY: no changes in vision, tingling or numbness in hands or feet.   MSK: no muscle aches or pains, weakness  PSYCH: mood stable    Past Medical History:   Diagnosis Date     Type 1 diabetes (H)        No past surgical history on file.    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 Enjoys going to the gym/running.     Socioeconomic History     Marital status: Single     Spouse name: None      "Number of children: None     Years of education: None     Highest education level: None   Tobacco Use     Smoking status: Never Smoker     Smokeless tobacco: Never Used   Substance and Sexual Activity     Alcohol use: Yes     Comment: 2 / week       Current Outpatient Medications   Medication     blood glucose (NO BRAND SPECIFIED) test strip     blood glucose meter (GLUCOMETER)     Continuous Blood Gluc Sensor (FREESTYLE CAPRICE 2 SENSOR) [facility]     Injection Device for insulin (INPEN 100-GREY-JUANA-HUMALOG) LILIAN     insulin glargine (LANTUS PEN) 100 UNIT/ML pen     insulin lispro (HUMALOG KWIKPEN) 100 UNIT/ML (1 unit dial) KWIKPEN     insulin pen needle (32G X 4 MM) 32G X 4 MM miscellaneous     pen needle, diabetic (BD ULTRA-FINE KATRIN PEN NEEDLE) 32 gauge x 5/32\" Ndle     No current facility-administered medications for this visit.        No Known Allergies    Physical Exam  There were no vitals taken for this visit.  GENERAL: healthy, alert and no distress  RESP: no audible wheeze, cough, or visible cyanosis.  No visible retractions or increased work of breathing.  Able to speak fully in complete sentences.  PSYCH: mentation appears normal, affect normal/bright, judgement and insight intact, normal speech and appearance well-groomed  RESULTS  Lab Results   Component Value Date    A1C 6.6 (H) 02/14/2022    A1C 6.5 (H) 07/09/2018    A1C 6.7 (H) 01/04/2018       TSH   Date Value Ref Range Status   01/04/2018 1.52 0.30 - 5.00 uIU/mL Final       ALT   Date Value Ref Range Status   02/14/2022 31 0 - 45 U/L Final   ]    Recent Labs   Lab Test 02/14/22  1205 07/09/18  0757   CHOL 177 154   HDL 65 55    86   TRIG 42 63       Lab Results   Component Value Date     02/14/2022      Lab Results   Component Value Date    POTASSIUM 4.7 02/14/2022     Lab Results   Component Value Date    CHLORIDE 104 02/14/2022     Lab Results   Component Value Date    NOE 9.8 02/14/2022     Lab Results   Component Value Date    CO2 28 " 02/14/2022     Lab Results   Component Value Date    BUN 15 02/14/2022     Lab Results   Component Value Date    CR 0.94 02/14/2022       GFR Estimate   Date Value Ref Range Status   02/14/2022 >90 >60 mL/min/1.73m2 Final     Comment:     Effective December 21, 2021 eGFRcr in adults is calculated using the 2021 CKD-EPI creatinine equation which includes age and gender (Pierre goodwin al., [place], DOI: 10.1056/CUDTsn6611361)   07/09/2018 >60 >60 mL/min/1.73m2 Final   01/04/2018 >60 >60 mL/min/1.73m2 Final     GFR Estimate If Black   Date Value Ref Range Status   07/09/2018 >60 >60 mL/min/1.73m2 Final   01/04/2018 >60 >60 mL/min/1.73m2 Final       No results found for: MICROL  No results found for: MICROALBUMIN  No results found for: CPEPT, GADAB, ISCAB    No results found for: B12]    Most recent eye exam date: : Not Found     Assessment/Plan:     1.  Type 1 diabetes- Excellent control.  A1c has been in the 6% range.  Will check a1c. He would like to proceed with starting the In-pen bluetooth insulin pen.  Could also benefit from the Omnipod 5 hybrid closed loop pump in the future (if he prefers tubeless) or Tandem X2 pump.  Will also switch to Dexcom G6 sensor so that it can pair with the In-pen.  Will review options with DM education.  His glucose is a bit high after breakfast, so we made the following changes today (instructions given to patient):     In pen settings:   Carb ratio:   Mid- 1/15g  5am- 1/12g  11am- 1/15g    Correction factor: 50     Active insulin: 3 hours    Try working on doing a 10-15 minute walk/jog prior to workout.      Schedule in diabetes education to follow up on In-pen and also discuss Omnipod 5.   I have prescribed dexcom g6.  If not covered, we will plan to switch to Vee 3.     Schedule eye exam.     Schedule labs.     Emergency issues: Please contact the clinic as soon as you recognize a problem.  Here are some concerns you should contact us about.  -Vomiting: more than twice.  Please check  ketones.  If positive, go to ER. Monitor glucose hourly.   -High glucose (over 300 mg/dL twice in a row): Please check ketones.  If ketones are negative, take an insulin correction and recheck glucose in 1 hour.  If glucose is not coming down, please call the clinic. If ketones are moderate or large, drink lots of water, take an insulin correction, and recheck ketones in 1 hour.  If ketones are still high (or you are vomiting), go to the ER.  -Hypoglycemia (low glucose):   If glucose is less than 70 mg/dL, treat with 15g carb (4 glucose tablets), recheck glucose in 10 minutes.  If low again, repeat.   If glucose is less than 54 mg/dL, treat with 30g carb, recheck glucose in 10 minutes.  If low again, repeat.  Keep glucagon in your home in case of severe hypoglycemia and train someone how to use this.    Emergency kit (please ensure you always have these with you):   Glucose tablets  Glucagon  Insulin  Syringes (if on a pump)  Extra infusion set (if on a pump)  Ketone strips    Contact information:   If you have concerns, please send me a [employer] message or call the clinic at [phone].  For more urgent concerns, please call [phone] after hours/weekends and ask to speak with the endocrinologist on call.      Please let me know if you are having low blood sugars less than 70 or over 350 mg/dL.  Do not wait until your next appointment if this is happening.      2.  Risk factors-     Retinopathy:  No. Due for eye exam.  Will schedule.   Nephropathy:  BP historically well controlled. No microalbuminuria.  Creatinine stable.   Neuropathy: No.    Feet: OK, no ulcers.   Lipids:  LDL at target.  Statin not indicated.  Thyroid screening: TSH normal 2018.  Will recheck.   Celiac screening: will screen antibodies.     3.  F/U in 3 mos with Dr. Santiago or myself, sooner with concerns.     42 minutes spent on the date of the encounter doing chart review, review of test results, review of continuous glucose sensor,  interpretation of glucose data, patient visit and documentation, counseling/coordination of care, and discussion of follow up plan for worsening hyper and hypoglycemia.  The patient understood and is satisfied with today's visit.       Scarlett Weiner PA-C, MPAS   [facility]  Department of Medicine  Division of Endocrinology and Diabetes        Again, thank you for allowing me to participate in the care of your patient.      Sincerely,    Scarlett Weiner PA-C

## 2023-01-09 NOTE — LETTER
Date:January 12, 2023      Patient was self referred, no letter generated. Do not send.        [facility] Health Information

## 2023-01-11 ENCOUNTER — TELEPHONE (OUTPATIENT)
Dept: ENDOCRINOLOGY | Facility: CLINIC | Age: 33
End: 2023-01-11

## 2023-01-11 DIAGNOSIS — E10.649: Primary | ICD-10-CM

## 2023-01-11 RX ORDER — BLOOD-GLUCOSE SENSOR
1 EACH MISCELLANEOUS
Qty: 7 EACH | Refills: 3 | Status: SHIPPED | OUTPATIENT
Start: 2023-01-11 | End: 2023-07-24

## 2023-01-11 NOTE — TELEPHONE ENCOUNTER
MTM referral from: [facility] visit (referral by provider) for In-pen start    MTM referral outreach attempt #2 on January 11, 2023 at 12:36 PM      Outcome: Patient not reachable after several attempts, will route to MTM Pharmacist/Provider as an FYI.  MT scheduling number is [phone].  Thank you for the referral.    Tequila Bowen, [facility]     Patient has [facility] Ind&Fam coverage

## 2023-01-11 NOTE — TELEPHONE ENCOUNTER
Pt Scheduled for July 24, 2023 at 8 am for virtual visit with Scarlett Weiner (follow up). Pt did not see it necessary to make an apt for April. Stated that was too soon and that July would be better. Per Scarlett's notes on 01/09/2023, pt was to return for a follow up in 3 months.

## 2023-01-16 ENCOUNTER — TELEPHONE (OUTPATIENT)
Dept: ENDOCRINOLOGY | Facility: CLINIC | Age: 33
End: 2023-01-16

## 2023-01-16 NOTE — TELEPHONE ENCOUNTER
Spoke w/ Pt: states he is not comfortable taking novolog and needs letter of appeal. Took novolog and basaglar  Dangerous effects - low blood sugars  Needed help with getting glucagon to bring blood sugars back up   Insulin reaction - unpredictable glucose   Severe headaches   Missed work.   Provider notified.  Day Charlton RN on 1/16/2023 at 3:06 PM

## 2023-01-19 ENCOUNTER — MYC MEDICAL ADVICE (OUTPATIENT)
Dept: ENDOCRINOLOGY | Facility: CLINIC | Age: 33
End: 2023-01-19
Payer: COMMERCIAL

## 2023-01-31 ENCOUNTER — VIRTUAL VISIT (OUTPATIENT)
Dept: EDUCATION SERVICES | Facility: CLINIC | Age: 33
End: 2023-01-31
Attending: PHYSICIAN ASSISTANT
Payer: COMMERCIAL

## 2023-01-31 DIAGNOSIS — E10.9 TYPE 1 DIABETES MELLITUS WITHOUT COMPLICATION (H): ICD-10-CM

## 2023-01-31 PROCEDURE — G0108 DIAB MANAGE TRN  PER INDIV: HCPCS | Mod: 95

## 2023-01-31 NOTE — PROGRESS NOTES
Video-Visit Details    Type of service:  Video Visit  Patient consented to video visit  Video Start Time:  1p  Video End Time:   140p  Originating Location (pt. Location): Home  Distant Location (provider location):  [facility] DIABETES EDUCATION [place]   Platform used for Video Visit: [employer]    Diabetes Self-Management Education & Support    Ryan Garcia presents today for education related to Type 1 diabetes    Patient is being treated with:  insulin  He is accompanied by self    Year of diagnosis: Age 15 months  Referring provider:  SHIRA Griffith  Living Situation: with parents  Employment: Chiropractor    PATIENT CONCERNS RELATED TO DIABETES SELF MANAGEMENT:  Pt here to discuss In-pen      ASSESSMENT:    Taking Medication:     Current Diabetes Management per Patient:    Lantus 18 units @HS  Humalog 1 unit for every 12 grams @ Breakfast 1 unit for 15 grams at lunch and dinner\  +correction of 1u for every 50 pts above 150    Monitoring      Patient's most recent   Lab Results   Component Value Date    A1C 6.6 02/14/2022      Patient's A1C goal: <7.0    Activity:  Runs in summer. Currently short run at gym then lifting 3-4x a week    Healthy Eating:   Patient currently eats 3 meals 1-2 snacks per day   Breakfast cereal (cheerios) and 1 cu skim milk and coffee  Lunch leftovers  Dinner Protein veggies and apple occ bread    Snacks protein bars water    Problem Solving:      Patient is at risk of hypoglycemia?: YES- treats with Gatorade    Healthy Coping and Stress Management:   Sources of stress identified by patient [employer]  Coping mechanisms identified by patient:  Exercising        EDUCATION and INSTRUCTION PROVIDED AT THIS VISIT:     Pt had 1x1 visit to discuss sensor readings and starting In-Pen. Pt has 2 In-pens at home but doesn't have the Humalog cartridges. The insulin was ordered after his visit with Scarlett on 1/9/23. He will contact the pharmacy. Pt's main reason for getting the  In-pen he wants to be able to give 1/2 unit doses.  Pt is a little concerned if he can give more insulin than the In-pen suggests if he feels he needs more.  We discussed he could override the recommendation from the Pen.  If pt is not happy with the In-pen we did discuss there is Humalog JR Pens he could request. Pt is switching to Vee 3 in a month. He doesn't have good coverage for Dexcom. Pt is not interested in a pump at this time.  He is doing an excellent job managing his diabetes with MDI. His currently time in target is 80%, with 2% lows. We did discuss his elevated post breakfast blood sugars. He has ate the same breakfast (cereal and milk) for 20 years and recently is getting post breakfast BG spikes. He will check to see if black coffee is what is causing increase in BG. IF so he will add 2-4 units of Humalog for coffee  and he will try switching to eggs/toast.  If neither improve post breakfast BG's advised to switch to 1 unit per 10 grams of carb at breakfast. Pt will set up In-Pen on his own. Settings sent to to pt via [employer]. Pt will reach out to CDE with any questions and follow up PRN    Patient-stated goal written and given to Ryan Garcia.  Verbalized and demonstrated understanding of instructions.     PLAN:    Switch to In-Pen for your Humalog  Settings as follows    Carb ratio:   Mid- 1/15g  5am- 1/12g  11am- 1/15g     Correction factor: 50   Target Blood sugar (140 or 150)   Active insulin: 3 hours    Also try eggs/toast for breakfast vs cereal  and check if coffee is impacting blood sugar. If it is try taking Humalog with coffee     Marlee SAINZN, RN, [facility]  Certified Diabetes Care and   [facility] Endocrinology and Diabetes   Clinics and [facility] Center  [address] [place] [address]  Phone [phone]      FOLLOW-UP:    As needed with Diabetes Ed    Time spent with patient at today's visit was 30 minutes.      Any diabetes medication dose changes were made via the CDE  Protocol and Collaborative Practice Agreement with [place] and  [facility].  A copy of this encounter was provided to patient's referring provider.

## 2023-02-27 ENCOUNTER — MYC MEDICAL ADVICE (OUTPATIENT)
Dept: ENDOCRINOLOGY | Facility: CLINIC | Age: 33
End: 2023-02-27
Payer: COMMERCIAL

## 2023-02-27 ENCOUNTER — TELEPHONE (OUTPATIENT)
Dept: ENDOCRINOLOGY | Facility: CLINIC | Age: 33
End: 2023-02-27

## 2023-02-27 DIAGNOSIS — E10.9 TYPE 1 DIABETES MELLITUS (H): ICD-10-CM

## 2023-02-27 DIAGNOSIS — E10.9 TYPE 1 DIABETES MELLITUS WITHOUT COMPLICATION (H): ICD-10-CM

## 2023-02-27 DIAGNOSIS — E11.9 DIABETES MELLITUS (H): Primary | ICD-10-CM

## 2023-02-27 NOTE — TELEPHONE ENCOUNTER
PA Initiation    Medication: Lantus SoloStar 100UNIT/ML pen-injectors -pa renewal pending   Insurance Company:    Pharmacy Filling the Rx:    Filling Pharmacy Phone:    Filling Pharmacy Fax:    Start Date: 2/27/2023    Key: [id number]

## 2023-02-27 NOTE — TELEPHONE ENCOUNTER
Pharmacy faxes notice they are unable to find humalog cartridge in their data base. Rx sent to  Mail Order. Copy on file.   Day Charlton RN on 2/27/2023 at 1:49 PM

## 2023-02-28 DIAGNOSIS — E11.9 TYPE 2 DIABETES MELLITUS WITHOUT COMPLICATION, WITHOUT LONG-TERM CURRENT USE OF INSULIN (H): ICD-10-CM

## 2023-02-28 RX ORDER — INSULIN LISPRO 100 [IU]/ML
INJECTION, SOLUTION INTRAVENOUS; SUBCUTANEOUS
Qty: 45 ML | Refills: 3 | Status: SHIPPED | OUTPATIENT
Start: 2023-02-28 | End: 2023-11-20

## 2023-02-28 NOTE — TELEPHONE ENCOUNTER
Prior Authorization Approval    Authorization Effective Date: 1/28/2023  Authorization Expiration Date: 2/27/2024  Medication: Lantus SoloStar 100UNIT/ML pen-injectors -pa renewal approved   Approved Dose/Quantity: uud   Reference #: Key: [id number]   Insurance Company:    Expected CoPay:       CoPay Card Available:      Foundation Assistance Needed:    Which Pharmacy is filling the prescription (Not needed for infusion/clinic administered):    Pharmacy Notified:    Patient Notified:

## 2023-03-01 NOTE — TELEPHONE ENCOUNTER
"Routing refill request to provider for review/approval because:  Patient needs to be seen because it has been more than 6 months since last office visit.    Last Written Prescription Date:  02/10/2022   Last Fill Quantity: 800,  # refills: 3   Last office visit provider:  02/17/2022     Requested Prescriptions   Pending Prescriptions Disp Refills     blood glucose (NO BRAND SPECIFIED) test strip 800 strip 3     Sig: Use to test blood sugar 8 times daily or as directed.       Diabetic Supplies Protocol Failed - 3/1/2023 11:10 AM        Failed - Recent (6 mo) or future (30 days) visit within the authorizing provider's specialty     Patient had office visit in the last 6 months or has a visit in the next 30 days with authorizing provider.  See \"Patient Info\" tab in inbasket, or \"Choose Columns\" in Meds & Orders section of the refill encounter.            Passed - Medication is active on med list        Passed - Patient is 18 years of age or older             Emilee Salgado RN 03/01/23 11:10 AM  "

## 2023-05-03 DIAGNOSIS — E10.9 TYPE 1 DIABETES MELLITUS WITHOUT COMPLICATION (H): ICD-10-CM

## 2023-05-27 ENCOUNTER — HEALTH MAINTENANCE LETTER (OUTPATIENT)
Age: 33
End: 2023-05-27

## 2023-07-17 NOTE — PROGRESS NOTES
Outcome for 07/17/23 10:10 AM: Data uploaded on Virtual 3-D Display for Smartphones  Tonia Medina MA  Outcome for 07/17/23 10:10 AM: Links Global message sent  Tonia Medina MA  Outcome for 07/20/23 1:16 PM: Data obtained via Virtual 3-D Display for Smartphones website- pt states he does not use inpen summer.   Tonia Medina MA       Start time: 0808  End time: 0832  Provider location: off site- home  Patient location: off site- home.    HPI:   Ryan is a 33 yo man here for follow up of type 1 diabetes, diagnosed at 15 mos of age.  He also sees Dr. Santiago.  He has well controlled diabetes and has had no complications. He does have a history of severe hypoglycemia when he was switched to basaglar insulin.  No issues other than this. He is quite physically active and is able to manage his glucose well, even with activity.  He does something physical each day, running (although less in the winter as he prefers to run outside), weight lifting at the gy,, etc. He finds that if he is not doing any cardio, blood sugars go up significantly at the gym.   He takes a dose of insulin before he goes in order to prevent this.  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  Blood sugars have been pretty good.  He feels the weaks we made at his last visit have been good.  Morning spikes are not as significant anymore.  Pre-bolusing more regularly.      He feels that the sensor has been one of the best things he has ever done.  The carlos 3 is more accurate than the carlos 2.      His current regimen is as follows:   Lantus- 21 units.   Humalog- 1/15g plus correction 1/50 over 150 mg/dL.       Ryan wears a carlos 2 sensor.  He checks his glucose continuously with the sensor. His overall average glucose over the past 2 weeks is 122 mg/dL (CV 28%, TIR 93%).           Diabetes Type: Type 1  Diabetes Duration: since age 15 mos.   Diabetes Control:   Lab Results   Component Value Date    A1C  6.6 02/14/2022    A1C 6.5 07/09/2018    A1C 6.7 01/04/2018       ROS  GENERAL: no weight loss, weight gain, fevers, chills, malaise, night sweats.   HEENT: no dysphagia, diplopia, neck pain or tenderness, dry/scratchy eyes, URI, cough, sinus drainage, tinnitus, sinus pressure  CV: no chest pain, pressure, palpitations, skipped beats, LOC  LUNGS: no SOB, FRY, cough, sputum production, wheezing   GI: no diarrhea, constipation, abdominal pain  EXTREMITIES: no rashes, ulcers, edema  NEUROLOGY: no changes in vision, tingling or numbness in hands or feet.   MSK: no muscle aches or pains, weakness  PSYCH: mood stable    Past Medical History:   Diagnosis Date     Type 1 diabetes (H)        No past surgical history on file.    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 Enjoys going to the gym/running.     Socioeconomic History     Marital status: Single     Spouse name: None     Number of children: None     Years of education: None     Highest education level: None   Tobacco Use     Smoking status: Never Smoker     Smokeless tobacco: Never Used   Substance and Sexual Activity     Alcohol use: Yes     Comment: 2 / week       Current Outpatient Medications   Medication     blood glucose (NO BRAND SPECIFIED) test strip     blood glucose meter (GLUCOMETER)     Continuous Blood Gluc Sensor (DEXCOM G6 SENSOR) MISC     Continuous Blood Gluc Sensor (FREESTYLE CAPRICE 2 SENSOR) MISC     Continuous Blood Gluc Sensor (FREESTYLE CAPRICE 3 SENSOR) MISC     Continuous Blood Gluc Transmit (DEXCOM G6 TRANSMITTER) MISC     Glucagon (BAQSIMI TWO PACK) 3 MG/DOSE POWD     HUMALOG KWIKPEN 100 UNIT/ML soln     Injection Device for insulin (INPEN 100-GREY-JUANA-HUMALOG) LILIAN     insulin glargine (LANTUS PEN) 100 UNIT/ML pen     insulin lispro (HUMALOG KWIKPEN) 100 UNIT/ML (1 unit dial) KWIKPEN      "insulin lispro (HUMALOG PENFILL) 100 UNIT/ML Cartridge     insulin pen needle (32G X 4 MM) 32G X 4 MM miscellaneous     KETOSTIX test strip     pen needle, diabetic (BD ULTRA-FINE KATRIN PEN NEEDLE) 32 gauge x 5/32\" Ndle     No current facility-administered medications for this visit.        No Known Allergies    Physical Exam  There were no vitals taken for this visit.  GENERAL: healthy, alert and no distress  RESP: no audible wheeze, cough, or visible cyanosis.  No visible retractions or increased work of breathing.  Able to speak fully in complete sentences.  PSYCH: mentation appears normal, affect normal/bright, judgement and insight intact, normal speech and appearance well-groomed    RESULTS  Lab Results   Component Value Date    A1C 6.6 (H) 02/14/2022    A1C 6.5 (H) 07/09/2018    A1C 6.7 (H) 01/04/2018       TSH   Date Value Ref Range Status   01/04/2018 1.52 0.30 - 5.00 uIU/mL Final       ALT   Date Value Ref Range Status   02/14/2022 31 0 - 45 U/L Final   ]    Recent Labs   Lab Test 02/14/22  1205 07/09/18  0757   CHOL 177 154   HDL 65 55    86   TRIG 42 63       Lab Results   Component Value Date     02/14/2022      Lab Results   Component Value Date    POTASSIUM 4.7 02/14/2022     Lab Results   Component Value Date    CHLORIDE 104 02/14/2022     Lab Results   Component Value Date    NOE 9.8 02/14/2022     Lab Results   Component Value Date    CO2 28 02/14/2022     Lab Results   Component Value Date    BUN 15 02/14/2022     Lab Results   Component Value Date    CR 0.94 02/14/2022       GFR Estimate   Date Value Ref Range Status   02/14/2022 >90 >60 mL/min/1.73m2 Final     Comment:     Effective December 21, 2021 eGFRcr in adults is calculated using the 2021 CKD-EPI creatinine equation which includes age and gender (Pierre goodwin al., NEJM, DOI: 10.1056/WKHDon0714034)   07/09/2018 >60 >60 mL/min/1.73m2 Final   01/04/2018 >60 >60 mL/min/1.73m2 Final     GFR Estimate If Black   Date Value Ref Range " Status   07/09/2018 >60 >60 mL/min/1.73m2 Final   01/04/2018 >60 >60 mL/min/1.73m2 Final       No results found for: MICROL  No results found for: MICROALBUMIN  No results found for: CPEPT, GADAB, ISCAB    No results found for: B12]    Most recent eye exam date: : Not Found     Assessment/Plan:     1.  Type 1 diabetes- Excellent control. Discussed switch from humalog to Lyumjev to minimize pre-bolus time.  He is interested in trying this.  Discussed heart healthy eating ideas and encouraged patient to increase consumption of fruits, vegetables and whole grains (high fiber diet). Will meet with dietitian to discuss moderate carb, high fiber diet.  He will keep food records before meeting with her. We made the following plan today (instructions given to patient):        Emergency issues: Please contact the clinic as soon as you recognize a problem.  Here are some concerns you should contact us about.  -Vomiting: more than twice.  Please check ketones.  If positive, go to ER. Monitor glucose hourly.   -High glucose (over 300 mg/dL twice in a row): Please check ketones.  If ketones are negative, take an insulin correction and recheck glucose in 1 hour.  If glucose is not coming down, please call the clinic. If ketones are moderate or large, drink lots of water, take an insulin correction, and recheck ketones in 1 hour.  If ketones are still high (or you are vomiting), go to the ER.  -Hypoglycemia (low glucose):   If glucose is less than 70 mg/dL, treat with 15g carb (4 glucose tablets), recheck glucose in 10 minutes.  If low again, repeat.   If glucose is less than 54 mg/dL, treat with 30g carb, recheck glucose in 10 minutes.  If low again, repeat.  Keep glucagon in your home in case of severe hypoglycemia and train someone how to use this.    Emergency kit (please ensure you always have these with you):   Glucose tablets  Glucagon  Insulin  Syringes (if on a pump)  Extra infusion set (if on a pump)  Ketone  strips    Contact information:   If you have concerns, please send me a Digital Message Display message or call the clinic at [phone].  For more urgent concerns, please call [phone] after hours/weekends and ask to speak with the endocrinologist on call.      Please let me know if you are having low blood sugars less than 70 or over 350 mg/dL.  Do not wait until your next appointment if this is happening.      2.  Risk factors-     Retinopathy:  No. Due for eye exam.  Will schedule.   Nephropathy:  BP historically well controlled. No microalbuminuria.  Creatinine stable.   Neuropathy: No.    Feet: OK, no ulcers.   Lipids:  LDL at target.  Statin not indicated.  Thyroid screening: TSH normal 2018.  Will recheck.   Celiac screening: will screen antibodies.     3.  F/U in 3 mos with Dr. Santiago or myself, sooner with concerns.     42 minutes spent on the date of the encounter doing chart review, review of test results, review of continuous glucose sensor, interpretation of glucose data, patient visit and documentation, counseling/coordination of care, and discussion of follow up plan for worsening hyper and hypoglycemia.  The patient understood and is satisfied with today's visit.       Scarlett Weiner PA-C, MPAS   [facility]  Department of Medicine  Division of Endocrinology and Diabetes          Answers for HPI/ROS submitted by the patient on 7/24/2023  General Symptoms: No  Skin Symptoms: No  HENT Symptoms: No  EYE SYMPTOMS: No  HEART SYMPTOMS: No  LUNG SYMPTOMS: No  INTESTINAL SYMPTOMS: No  URINARY SYMPTOMS: No  REPRODUCTIVE SYMPTOMS: No  SKELETAL SYMPTOMS: No  BLOOD SYMPTOMS: No  NERVOUS SYSTEM SYMPTOMS: No  MENTAL HEALTH SYMPTOMS: No

## 2023-07-20 ENCOUNTER — TELEPHONE (OUTPATIENT)
Dept: ENDOCRINOLOGY | Facility: CLINIC | Age: 33
End: 2023-07-20
Payer: COMMERCIAL

## 2023-07-20 NOTE — TELEPHONE ENCOUNTER
Spoke with patient. Pt is using inpen but does not use summer. Pt states unable to upload data for appt. Tonia Medina CMA on 7/20/2023 at 1:19 PM

## 2023-07-24 ENCOUNTER — VIRTUAL VISIT (OUTPATIENT)
Dept: ENDOCRINOLOGY | Facility: CLINIC | Age: 33
End: 2023-07-24
Payer: COMMERCIAL

## 2023-07-24 DIAGNOSIS — E10.649: ICD-10-CM

## 2023-07-24 PROCEDURE — 99215 OFFICE O/P EST HI 40 MIN: CPT | Mod: VID | Performed by: PHYSICIAN ASSISTANT

## 2023-07-24 RX ORDER — INSULIN LISPRO-AABC 100 [IU]/ML
30 INJECTION, SOLUTION SUBCUTANEOUS DAILY
Qty: 15 ML | Refills: 11 | Status: SHIPPED | OUTPATIENT
Start: 2023-07-24 | End: 2023-11-20

## 2023-07-24 RX ORDER — BLOOD-GLUCOSE SENSOR
1 EACH MISCELLANEOUS
Qty: 7 EACH | Refills: 3 | Status: SHIPPED | OUTPATIENT
Start: 2023-07-24

## 2023-07-24 NOTE — LETTER
7/24/2023       RE: Ryan Garcia  [address]  [facility] [address]     Dear Colleague,    Thank you for referring your patient, Ryan Garcia, to the [facility] [place] [facility]. Please see a copy of my visit note below.    Outcome for 07/17/23 10:10 AM: Data uploaded on Puzzle[employer]  Tonia Medina MA  Outcome for 07/17/23 10:10 AM: Car reviews message sent  Tonia Medina MA  Outcome for 07/20/23 1:16 PM: Data obtained via Puzzle[employer] website- pt states he does not use inpen summer.   Tonia Medina MA       Start time: 0808  End time: 0832  Provider location: off site- home  Patient location: off site- home.    HPI:   Ryan is a 31 yo man here for follow up of type 1 diabetes, diagnosed at 15 mos of age.  He also sees Dr. Santiago.  He has well controlled diabetes and has had no complications. He does have a history of severe hypoglycemia when he was switched to basaglar insulin.  No issues other than this. He is quite physically active and is able to manage his glucose well, even with activity.  He does something physical each day, running (although less in the winter as he prefers to run outside), weight lifting at the gy,, etc. He finds that if he is not doing any cardio, blood sugars go up significantly at the gym.   He takes a dose of insulin before he goes in order to prevent this.  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  Blood sugars have been pretty good.  He feels the weaks we made at his last visit have been good.  Morning spikes are not as significant anymore.  Pre-bolusing more regularly.      He feels that the sensor has been one of the best things he has ever done.  The carlos 3 is more accurate than the carlos 2.      His current regimen is as follows:   Lantus- 21 units.   Humalog- 1/15g plus correction  1/50 over 150 mg/dL.       Ryan wears a caprice 2 sensor.  He checks his glucose continuously with the sensor. His overall average glucose over the past 2 weeks is 122 mg/dL (CV 28%, TIR 93%).           Diabetes Type: Type 1  Diabetes Duration: since age 15 mos.   Diabetes Control:   Lab Results   Component Value Date    A1C 6.6 02/14/2022    A1C 6.5 07/09/2018    A1C 6.7 01/04/2018       ROS  GENERAL: no weight loss, weight gain, fevers, chills, malaise, night sweats.   HEENT: no dysphagia, diplopia, neck pain or tenderness, dry/scratchy eyes, URI, cough, sinus drainage, tinnitus, sinus pressure  CV: no chest pain, pressure, palpitations, skipped beats, LOC  LUNGS: no SOB, FRY, cough, sputum production, wheezing   GI: no diarrhea, constipation, abdominal pain  EXTREMITIES: no rashes, ulcers, edema  NEUROLOGY: no changes in vision, tingling or numbness in hands or feet.   MSK: no muscle aches or pains, weakness  PSYCH: mood stable    Past Medical History:   Diagnosis Date    Type 1 diabetes (H)        No past surgical history on file.    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 Enjoys going to the gym/running.     Socioeconomic History    Marital status: Single     Spouse name: None    Number of children: None    Years of education: None    Highest education level: None   Tobacco Use    Smoking status: Never Smoker    Smokeless tobacco: Never Used   Substance and Sexual Activity    Alcohol use: Yes     Comment: 2 / week       Current Outpatient Medications   Medication    blood glucose (NO BRAND SPECIFIED) test strip    blood glucose meter (GLUCOMETER)    Continuous Blood Gluc Sensor (DEXCOM G6 SENSOR) MISC    Continuous Blood Gluc Sensor (FREESTYLE CAPRICE 2 SENSOR) MISC    Continuous Blood Gluc Sensor (FREESTYLE CAPRICE 3 SENSOR) [facility]    Continuous Blood Gluc Transmit  "(DEXCOM G6 TRANSMITTER) MISC    Glucagon (BAQSIMI TWO PACK) 3 MG/DOSE POWD    HUMALOG KWIKPEN 100 UNIT/ML soln    Injection Device for insulin (INPEN 100-GREY-JUANA-HUMALOG) LILIAN    insulin glargine (LANTUS PEN) 100 UNIT/ML pen    insulin lispro (HUMALOG KWIKPEN) 100 UNIT/ML (1 unit dial) KWIKPEN    insulin lispro (HUMALOG PENFILL) 100 UNIT/ML Cartridge    insulin pen needle (32G X 4 MM) 32G X 4 MM miscellaneous    KETOSTIX test strip    pen needle, diabetic (BD ULTRA-FINE KATRIN PEN NEEDLE) 32 gauge x 5/32\" Ndle     No current facility-administered medications for this visit.        No Known Allergies    Physical Exam  There were no vitals taken for this visit.  GENERAL: healthy, alert and no distress  RESP: no audible wheeze, cough, or visible cyanosis.  No visible retractions or increased work of breathing.  Able to speak fully in complete sentences.  PSYCH: mentation appears normal, affect normal/bright, judgement and insight intact, normal speech and appearance well-groomed    RESULTS  Lab Results   Component Value Date    A1C 6.6 (H) 02/14/2022    A1C 6.5 (H) 07/09/2018    A1C 6.7 (H) 01/04/2018       TSH   Date Value Ref Range Status   01/04/2018 1.52 0.30 - 5.00 uIU/mL Final       ALT   Date Value Ref Range Status   02/14/2022 31 0 - 45 U/L Final   ]    Recent Labs   Lab Test 02/14/22  1205 07/09/18  0757   CHOL 177 154   HDL 65 55    86   TRIG 42 63       Lab Results   Component Value Date     02/14/2022      Lab Results   Component Value Date    POTASSIUM 4.7 02/14/2022     Lab Results   Component Value Date    CHLORIDE 104 02/14/2022     Lab Results   Component Value Date    NOE 9.8 02/14/2022     Lab Results   Component Value Date    CO2 28 02/14/2022     Lab Results   Component Value Date    BUN 15 02/14/2022     Lab Results   Component Value Date    CR 0.94 02/14/2022       GFR Estimate   Date Value Ref Range Status   02/14/2022 >90 >60 mL/min/1.73m2 Final     Comment:     Effective December " 21, 2021 eGFRcr in adults is calculated using the 2021 CKD-EPI creatinine equation which includes age and gender (Pierre goodwin al., NE, DOI: 10.1056/IKNRmq0312328)   07/09/2018 >60 >60 mL/min/1.73m2 Final   01/04/2018 >60 >60 mL/min/1.73m2 Final     GFR Estimate If Black   Date Value Ref Range Status   07/09/2018 >60 >60 mL/min/1.73m2 Final   01/04/2018 >60 >60 mL/min/1.73m2 Final       No results found for: MICROL  No results found for: MICROALBUMIN  No results found for: CPEPT, GADAB, ISCAB    No results found for: B12]    Most recent eye exam date: : Not Found     Assessment/Plan:     1.  Type 1 diabetes- Excellent control. Discussed switch from humalog to Lyumjev to minimize pre-bolus time.  He is interested in trying this.  Discussed heart healthy eating ideas and encouraged patient to increase consumption of fruits, vegetables and whole grains (high fiber diet). Will meet with dietitian to discuss moderate carb, high fiber diet.  He will keep food records before meeting with her. We made the following plan today (instructions given to patient):        Emergency issues: Please contact the clinic as soon as you recognize a problem.  Here are some concerns you should contact us about.  -Vomiting: more than twice.  Please check ketones.  If positive, go to ER. Monitor glucose hourly.   -High glucose (over 300 mg/dL twice in a row): Please check ketones.  If ketones are negative, take an insulin correction and recheck glucose in 1 hour.  If glucose is not coming down, please call the clinic. If ketones are moderate or large, drink lots of water, take an insulin correction, and recheck ketones in 1 hour.  If ketones are still high (or you are vomiting), go to the ER.  -Hypoglycemia (low glucose):   If glucose is less than 70 mg/dL, treat with 15g carb (4 glucose tablets), recheck glucose in 10 minutes.  If low again, repeat.   If glucose is less than 54 mg/dL, treat with 30g carb, recheck glucose in 10 minutes.  If  low again, repeat.  Keep glucagon in your home in case of severe hypoglycemia and train someone how to use this.    Emergency kit (please ensure you always have these with you):   Glucose tablets  Glucagon  Insulin  Syringes (if on a pump)  Extra infusion set (if on a pump)  Ketone strips    Contact information:   If you have concerns, please send me a [employer] message or call the clinic at [phone].  For more urgent concerns, please call [phone] after hours/weekends and ask to speak with the endocrinologist on call.      Please let me know if you are having low blood sugars less than 70 or over 350 mg/dL.  Do not wait until your next appointment if this is happening.      2.  Risk factors-     Retinopathy:  No. Due for eye exam.  Will schedule.   Nephropathy:  BP historically well controlled. No microalbuminuria.  Creatinine stable.   Neuropathy: No.    Feet: OK, no ulcers.   Lipids:  LDL at target.  Statin not indicated.  Thyroid screening: TSH normal 2018.  Will recheck.   Celiac screening: will screen antibodies.     3.  F/U in 3 mos with Dr. Santiago or myself, sooner with concerns.     42 minutes spent on the date of the encounter doing chart review, review of test results, review of continuous glucose sensor, interpretation of glucose data, patient visit and documentation, counseling/coordination of care, and discussion of follow up plan for worsening hyper and hypoglycemia.  The patient understood and is satisfied with today's visit.       Scarlett Weiner PA-C, MPAS   [facility]  Department of Medicine  Division of Endocrinology and Diabetes          Answers for HPI/ROS submitted by the patient on 7/24/2023  General Symptoms: No  Skin Symptoms: No  HENT Symptoms: No  EYE SYMPTOMS: No  HEART SYMPTOMS: No  LUNG SYMPTOMS: No  INTESTINAL SYMPTOMS: No  URINARY SYMPTOMS: No  REPRODUCTIVE SYMPTOMS: No  SKELETAL SYMPTOMS: No  BLOOD SYMPTOMS: No  NERVOUS SYSTEM SYMPTOMS: No  MENTAL HEALTH  SYMPTOMS: No        Virtual Visit Details      Answers for HPI/ROS submitted by the patient on 7/24/2023  General Symptoms: No  Skin Symptoms: No  HENT Symptoms: No  EYE SYMPTOMS: No  HEART SYMPTOMS: No  LUNG SYMPTOMS: No  INTESTINAL SYMPTOMS: No  URINARY SYMPTOMS: No  REPRODUCTIVE SYMPTOMS: No  SKELETAL SYMPTOMS: No  BLOOD SYMPTOMS: No  NERVOUS SYSTEM SYMPTOMS: No  MENTAL HEALTH SYMPTOMS: No          Again, thank you for allowing me to participate in the care of your patient.      Sincerely,    Scarlett Weiner PA-C

## 2023-07-24 NOTE — NURSING NOTE
Is the patient currently in the state of [place]? YES    Visit mode:VIDEO    If the visit is dropped, the patient can be reconnected by: TELEPHONE VISIT: Phone number: [phone]    Will anyone else be joining the visit? NO      How would you like to obtain your AVS? MyChart    Are changes needed to the allergy or medication list? NO    Reason for visit: Video Visit (Follow-up )

## 2023-07-24 NOTE — PATIENT INSTRUCTIONS
Nice seeing you, Ryan!     Try lyumjev insulin- has a faster onset than humalog.  Take 0-5 minutes before eating.      Keep food records.  Schedule appt with dietitian.      Women should try to eat at least 21 to 25 grams of fiber a day, while men should aim for 30 to 38 grams a day.    Here's a look at how much dietary fiber is found in some common foods. When buying packaged foods, check the Nutrition Facts label for fiber content. It can vary among brands.    Fruits     Serving size  Total fiber (grams)*  Raspberries    1 cup   8.0  Pear     1 medium  5.5  Apple, with skin   1 medium  4.5  Banana    1 medium  3.0  Orange    1 medium  3.0  Strawberries    1 cup   3.0    Vegetables    Serving size  Total fiber (grams)*  Green peas, boiled   1 cup   9.0  Broccoli, boiled   1 cup chopped  5.0  Turnip greens, boiled   1 cup   5.0  [place] sprouts, boiled  1 cup   4.0  Potato, with skin, baked  1 medium  4.0  Sweet corn, boiled   1 cup   3.5  Cauliflower, raw   1 cup chopped  2.0  Carrot, raw    1 medium  1.5    Grains     Serving size  Total fiber (grams)*  Spaghetti, whole-wheat, cooked 1 cup   6.0  Barley, pearled, cooked  1 cup   6.0  Bran flakes    3/4 cup   5.5  Quinoa, cooked   1 cup   5.0  Oat bran muffin   1 medium  5.0  Oatmeal, instant, cooked  1 cup   5.0  Popcorn, air-popped   3 cups   3.5  Brown rice, cooked   1 cup   3.5  Bread, whole-wheat   1 slice   2.0  Bread, rye    1 slice   2.0    Legumes, nuts and seeds  Serving size  Total fiber (grams)*  Split peas, boiled   1 cup   16.0  Lentils, boiled    1 cup   15.5  Black beans, boiled   1 cup   15.0  Baked beans, canned   1 cup   10.0  Michael seeds    1 ounce  10.0  Almonds    1 ounce (23 nuts) 3.5  Pistachios    1 ounce (49 nuts) 3.0  Sunflower kernels   1 ounce  3.0  *Rounded to nearest 0.5 gram.    Source: USDA National Nutrient Database for Standard                                          Heart Healthy Diet and Lifestyle    - Eat your veggies and  fruits -- and switch to whole grains. An abundance and variety of plant foods should make up the majority of your meals. Strive for seven to 10 servings a day of veggies and fruits. Add in beans and lentils.  Switch to whole-grain bread and cereal, and begin to eat more whole-grain rice and pasta products.    - Go nuts. Keep almonds, cashews, pistachios and walnuts on hand for a quick snack. Choose natural peanut butter or almond butter, rather than the kind with hydrogenated fat added. Try hummus as a dip or spread for bread.    - Pass on the butter. Try olive or canola oil as a healthy replacement for butter or margarine. Use it in cooking. Dip bread in flavored olive oil or lightly spread it on whole-grain bread for a tasty alternative to butter.     - Spice it up. Herbs and spices make food tasty and are also rich in health-promoting substances. Season your meals with herbs and spices rather than salt.    - Go fish. Eat fish twice a week. Fresh or water-packed tuna, salmon, trout, mackerel and herring are healthy choices. Grilled fish tastes good and requires little cleanup. Avoid fried fish, unless it's sauteed in a small amount of canola oil.    - Rein in the red meat. Substitute fish and poultry for red meat. When eaten, make sure it's lean and keep portions small (about the size of a deck of cards). Try to limit sausage, ibarra and other high-fat or processed meats.    - Avoid being sedentary.  Decrease the amount of time spent in daily sedentary behavior.  Prolonged sitting should be interrupted every 30 min for blood glucose benefits.     - Be active.  30 minutes of moderate-to-vigorous intensity aerobic exercise at least 5 days a week or a total of 150 minutes spread over 3 days per week.  2-3 sessions/week of resistance exercise on nonconsecutive days. Flexibility training and balance training 2-3 times/week for older adults with diabetes.     Make an appointment with a dietitian for a personalized meal  plan/nutrition review.         Emergency issues: Please contact the clinic as soon as you recognize a problem.  Here are some concerns you should contact us about.  -Vomiting: more than twice.  Please check ketones.  If positive, go to ER. Monitor glucose hourly.   -High glucose (over 300 mg/dL twice in a row): Please check ketones.  If ketones are negative, take an insulin correction and recheck glucose in 1 hour.  If glucose is not coming down, please call the clinic. If ketones are moderate or large, drink lots of water, take an insulin correction, and recheck ketones in 1 hour.  If ketones are still high (or you are vomiting), go to the ER.  -Hypoglycemia (low glucose):   If glucose is less than 70 mg/dL, treat with 15g carb (4 glucose tablets), recheck glucose in 10 minutes.  If low again, repeat.   If glucose is less than 54 mg/dL, treat with 30g carb, recheck glucose in 10 minutes.  If low again, repeat.  Keep glucagon in your home in case of severe hypoglycemia and train someone how to use this.    Emergency kit (please ensure you always have these with you):   Glucose tablets  Glucagon  Insulin  Syringes (if on a pump)  Extra infusion set (if on a pump)  Ketone strips    Contact information:   If you have concerns, please send me a [employer] message or call the clinic at [phone].  For more urgent concerns, please call [phone] after hours/weekends and ask to speak with the endocrinologist on call.

## 2023-07-24 NOTE — PROGRESS NOTES
Virtual Visit Details      Answers for HPI/ROS submitted by the patient on 7/24/2023  General Symptoms: No  Skin Symptoms: No  HENT Symptoms: No  EYE SYMPTOMS: No  HEART SYMPTOMS: No  LUNG SYMPTOMS: No  INTESTINAL SYMPTOMS: No  URINARY SYMPTOMS: No  REPRODUCTIVE SYMPTOMS: No  SKELETAL SYMPTOMS: No  BLOOD SYMPTOMS: No  NERVOUS SYSTEM SYMPTOMS: No  MENTAL HEALTH SYMPTOMS: No

## 2023-07-25 ENCOUNTER — TELEPHONE (OUTPATIENT)
Dept: ENDOCRINOLOGY | Facility: CLINIC | Age: 33
End: 2023-07-25
Payer: COMMERCIAL

## 2023-07-25 NOTE — TELEPHONE ENCOUNTER
Central Prior Authorization Team   Phone: [phone]        Prior Authorization Request from My Chart Message:       [facility]  Received: Today  Nicky Mendes  P [facility]         Previous Messages    Forms (Lyumjev )  (Newest Message First)  View All Conversations on this Encounter  Nicky Mendes routed conversation to [facility]33 minutes ago (9:56 AM)     Nicky Mendes33 minutes ago (9:56 AM)     RB  Prior Authorization Retail Medication Request     Medication/Dose: Lyumjev kwik pen 100U/ML. Injuect 30 units under the skin daily.     ICD code (if different than what is on RX):  E10.649     Previously Tried and Failed:    Rationale:    Controlled type 1 diabetes mellitus with hypoglycemia, with long-term current use of insulin         Insurance Name:    Insurance ID:         Pharmacy Information (if different than what is on RX)  Name:    Phone:              Angella Vinson A [id number] hours ago (11:13 AM)     [place]  Good morning Ryan,      My name is Angella and I am a clinic pharmacy liaison here at [facility]. I am currently working on a Vee 3 Sensor prescription (copay $24.41) for you that is set to be sent to [facility]. I wanted to give you the great opportunity that you're also able to fill locally with our affiliated [facility] straight to your door!     If you would like to take advantage of this opportunity, please do let me know. I look forward to helping you achieve great health in the future!            Thank you,      Angella Lr [facility]  Clinic Liaison  [facility]  [email or web address]     Phone: [phone]  Fax: [phone]    This [employer] message has not been read.

## 2023-07-27 NOTE — TELEPHONE ENCOUNTER
PA Initiation    Medication: LYUMJEV KWIKPEN 100 UNIT/ML SC SOPN  Insurance Company: PA/[employer] - Phone [phone] Fax [phone]  Pharmacy Filling the Rx: [id number] [place], [place] - [address] AT [facility] [address]  Filling Pharmacy Phone: [phone]  Filling Pharmacy Fax: [phone]  Start Date: 7/26/2023

## 2023-07-27 NOTE — TELEPHONE ENCOUNTER
Prior Authorization Approval    Medication: LYUMJEV KWIKPEN 100 UNIT/ML SC SOPN  Authorization Effective Date: 6/27/2023  Authorization Expiration Date: 7/26/2024  Approved Dose/Quantity:   Reference #:     Insurance Company: PA/[employer] - Phone [phone] Fax [phone]  Expected CoPay:       CoPay Card Available:      Financial Assistance Needed:   Which Pharmacy is filling the prescription: [id number] [address] AT [facility] OF [address]  Pharmacy Notified: Yes  Patient Notified: Yes **Instructed pharmacy to notify patient when script is ready to /ship.**

## 2023-08-24 DIAGNOSIS — E10.9 TYPE 1 DIABETES MELLITUS WITHOUT COMPLICATION (H): ICD-10-CM

## 2023-08-24 NOTE — TELEPHONE ENCOUNTER
insulin pen needle (32G X 4 MM) 32G X 4 MM miscellaneous     Diabetic Supplies Protocol Passed   7/24/2023  [facility] Scarlett Peoples PA-C  Endocrinology, Diabetes, and Metabolism

## 2023-10-28 ENCOUNTER — HEALTH MAINTENANCE LETTER (OUTPATIENT)
Age: 33
End: 2023-10-28

## 2023-11-15 NOTE — PROGRESS NOTES
11/15/2023  PATIENT LAB/IMAGING STATUS : [facility] sent to patient to complete standing/future orders   BG data obtained via CGM portal :    HPI:   Ryan is a very pleasant 34 yo man here for follow up of type 1 diabetes, diagnosed at 15 mos of age.  He also sees Dr. Santiago.  He has well controlled diabetes and has had no complications. He does have a history of severe hypoglycemia when he was switched to basaglar insulin.  No issues other than this. He is quite physically active and is able to manage his glucose well, even with activity.  He does something physical each day, running (although less in the winter as he prefers to run outside), weight lifting at the gym, etc. He finds that if he is not doing any cardio, blood sugars go up significantly at the gym.   He takes a 1 unit dose of insulin before he goes in order to prevent this.  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 Blood sugars have been pretty good.  Pre-bolusing more regularly.  Since his last visit, he has been adding more fiber into his diet and is trying to eat less saturated fats (meat/cheese).  His glucose has been stable and his post-breakfast spikes are not as high.     His current regimen is as follows:   Lantus- 21 units.   Humalog- 1/15g plus correction 1/50 over 150 mg/dL.     Breakfast- oatmeal, milk. Less of a spike after eating.   Lunch- varies- veggies, protein.   Dinner- veggies, meat, lower carb. Some potato.   Some venison, part of a cow, fish.      Goes for a run around 6-7pm.  Running before dinner usually.  20 minute run.      Ryan wears a carlos 3 sensor.  He checks his glucose continuously with the sensor. His overall average glucose over the past 2 weeks is 126 mg/dL (CV 30%, TIR 91%).                 Diabetes Type: Type 1  Diabetes Duration: since age 15 mos.   Diabetes Control:   Lab Results   Component Value Date    A1C 6.6 02/14/2022    A1C 6.5 07/09/2018    A1C 6.7 01/04/2018        ROS  GENERAL: no weight loss, weight gain, fevers, chills, malaise, night sweats.   HEENT: no dysphagia, diplopia, neck pain or tenderness, dry/scratchy eyes, URI, cough, sinus drainage, tinnitus, sinus pressure  CV: no chest pain, pressure, palpitations, skipped beats, LOC  LUNGS: no SOB, FRY, cough, sputum production, wheezing   GI: no diarrhea, constipation, abdominal pain  EXTREMITIES: no rashes, ulcers, edema  NEUROLOGY: no changes in vision, tingling or numbness in hands or feet.   MSK: no muscle aches or pains, weakness  PSYCH: mood stable    Past Medical History:   Diagnosis Date    Type 1 diabetes (H)        No past surgical history on file.    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  Enjoys going to the gym/running.     Socioeconomic History    Marital status: Single     Spouse name: None    Number of children: None    Years of education: None    Highest education level: None   Tobacco Use    Smoking status: Never Smoker    Smokeless tobacco: Never Used   Substance and Sexual Activity    Alcohol use: Yes     Comment: 2 / week       Current Outpatient Medications   Medication    blood glucose (NO BRAND SPECIFIED) test strip    blood glucose meter (GLUCOMETER)    Continuous Blood Gluc Sensor (DEXCOM G6 SENSOR) MISC    Continuous Blood Gluc Sensor (FREESTYLE CAPRICE 3 SENSOR) MISC    Glucagon (BAQSIMI TWO PACK) 3 MG/DOSE POWD    HUMALOG KWIKPEN 100 UNIT/ML soln    Injection Device for insulin (INPEN 100-GREY-JUANA-HUMALOG) LILIAN    insulin glargine (LANTUS PEN) 100 UNIT/ML pen    insulin lispro (HUMALOG KWIKPEN) 100 UNIT/ML (1 unit dial) KWIKPEN    insulin lispro (HUMALOG PENFILL) 100 UNIT/ML Cartridge    Insulin Lispro-aabc, 1 U Dial, (LYUMJEV KWIKPEN) 100 UNIT/ML SOPN    insulin pen needle (32G X 4 MM) 32G X 4 MM  "miscellaneous    KETOSTIX test strip    pen needle, diabetic (BD ULTRA-FINE KATRIN PEN NEEDLE) 32 gauge x 5/32\" Ndle     No current facility-administered medications for this visit.        No Known Allergies  Physical Exam  /72   Pulse 63   Wt 91.2 kg (201 lb)   SpO2 98%   BMI 28.03 kg/m    GENERAL:  Alert and oriented X3, NAD, well dressed, answering questions appropriately, appears stated age.  HEENT: OP clear, no lymphadenopathy, no thyromegaly, non-tender, no exophthalmus, no proptosis, EOMI, no lid lag, no retraction  CV: RRR, no murmurs  LUNGS: CTAB, no wheezes, rales, or ronchi  EXTREMITIES: no edema, +pulses, no rashes, no lesions  NEUROLOGY: + monofilament, +vibratory sensation, + DTR upper and lower extremity    RESULTS  Lab Results   Component Value Date    A1C 6.2 (H) 11/20/2023    A1C 6.6 (H) 02/14/2022    A1C 6.5 (H) 07/09/2018    A1C 6.7 (H) 01/04/2018       TSH   Date Value Ref Range Status   11/20/2023 2.20 0.30 - 4.20 uIU/mL Final   01/04/2018 1.52 0.30 - 5.00 uIU/mL Final       ALT   Date Value Ref Range Status   02/14/2022 31 0 - 45 U/L Final   ]    Recent Labs   Lab Test 11/20/23  0835 02/14/22  1205   CHOL 159 177   HDL 65 65   LDL 86 104   TRIG 40 42       Lab Results   Component Value Date     11/20/2023      Lab Results   Component Value Date    POTASSIUM 4.3 11/20/2023    POTASSIUM 4.7 02/14/2022     Lab Results   Component Value Date    CHLORIDE 104 11/20/2023    CHLORIDE 104 02/14/2022     Lab Results   Component Value Date    NOE 9.3 11/20/2023     Lab Results   Component Value Date    CO2 28 11/20/2023    CO2 28 02/14/2022     Lab Results   Component Value Date    BUN 19.8 11/20/2023    BUN 15 02/14/2022     Lab Results   Component Value Date    CR 0.91 11/20/2023       GFR Estimate   Date Value Ref Range Status   11/20/2023 >90 >60 mL/min/1.73m2 Final   02/14/2022 >90 >60 mL/min/1.73m2 Final     Comment:     Effective December 21, 2021 eGFRcr in adults is calculated using " "the 2021 CKD-EPI creatinine equation which includes age and gender (Pierre goodwin al., [place], DOI: 10.1056/TBOZtz1703723)   07/09/2018 >60 >60 mL/min/1.73m2 Final   01/04/2018 >60 >60 mL/min/1.73m2 Final     GFR Estimate If Black   Date Value Ref Range Status   07/09/2018 >60 >60 mL/min/1.73m2 Final   01/04/2018 >60 >60 mL/min/1.73m2 Final       Lab Results   Component Value Date    MICROL <12.0 11/20/2023     No results found for: \"MICROALBUMIN\"  No results found for: \"CPEPT\", \"GADAB\", \"ISCAB\"    No results found for: \"B12\"]    Most recent eye exam date: : Not Found     Assessment/Plan:     1.  Type 1 diabetes- Excellent control. A1c is 6.2%.  He is having some hypoglycemia in the AM and minimal fluctuations otherwise.  His diet and lifestyle are excellent.  Encouragement given to continue with positive changes. Will try a warm up walk/jog before weight lifting instead of taking extra insulin to see if he can avoid the glucose rise.  We made the following plan today (instructions given to patient):      Schedule eye exam.      Lower Lantus by 1-2 units.     Emergency issues: Please contact the clinic as soon as you recognize a problem.  Here are some concerns you should contact us about.  -Vomiting: more than twice.  Please check ketones.  If positive, go to ER. Monitor glucose hourly.   -High glucose (over 300 mg/dL twice in a row): Please check ketones.  If ketones are negative, take an insulin correction and recheck glucose in 1 hour.  If glucose is not coming down, please call the clinic. If ketones are moderate or large, drink lots of water, take an insulin correction, and recheck ketones in 1 hour.  If ketones are still high (or you are vomiting), go to the ER.  -Hypoglycemia (low glucose):   If glucose is less than 70 mg/dL, treat with 15g carb (4 glucose tablets), recheck glucose in 10 minutes.  If low again, repeat.   If glucose is less than 54 mg/dL, treat with 30g carb, recheck glucose in 10 minutes.  If low " again, repeat.  Keep glucagon in your home in case of severe hypoglycemia and train someone how to use this.    Emergency kit (please ensure you always have these with you):   Glucose tablets  Glucagon  Insulin  Syringes (if on a pump)  Extra infusion set (if on a pump)  Ketone strips    Contact information:   If you have concerns, please send me a [employer] message or call the clinic at [phone].  For more urgent concerns, please call [phone] after hours/weekends and ask to speak with the endocrinologist on call.      2.  Risk factors-     Retinopathy:  No. Due for eye exam.  Will schedule.   Nephropathy:  BP historically well controlled. No microalbuminuria.  Creatinine stable.   Neuropathy: No.    Feet: OK, no ulcers.   Lipids:  LDL at target.  Statin not indicated.  Thyroid screening: TSH normal 11/2023.   Celiac screening: will screen.     3.  F/U in 3 mos with Dr. Santiago or myself, sooner with concerns.     42 minutes spent on the date of the encounter doing chart review, review of test results, review of continuous glucose sensor, interpretation of glucose data, patient visit and documentation, counseling/coordination of care, and discussion of follow up plan for worsening hyper and hypoglycemia.  The patient understood and is satisfied with today's visit.       Scarlett Weiner PA-C, MPAS   [facility]  Department of Medicine  Division of Endocrinology and Diabetes

## 2023-11-20 ENCOUNTER — LAB (OUTPATIENT)
Dept: LAB | Facility: CLINIC | Age: 33
End: 2023-11-20
Payer: COMMERCIAL

## 2023-11-20 ENCOUNTER — OFFICE VISIT (OUTPATIENT)
Dept: ENDOCRINOLOGY | Facility: CLINIC | Age: 33
End: 2023-11-20
Payer: COMMERCIAL

## 2023-11-20 VITALS
DIASTOLIC BLOOD PRESSURE: 72 MMHG | WEIGHT: 201 LBS | OXYGEN SATURATION: 98 % | BODY MASS INDEX: 28.03 KG/M2 | HEART RATE: 63 BPM | SYSTOLIC BLOOD PRESSURE: 115 MMHG

## 2023-11-20 DIAGNOSIS — E10.649: ICD-10-CM

## 2023-11-20 DIAGNOSIS — E10.9 WELL CONTROLLED TYPE 1 DIABETES MELLITUS (H): ICD-10-CM

## 2023-11-20 DIAGNOSIS — E10.9 TYPE 1 DIABETES MELLITUS WITHOUT COMPLICATION (H): ICD-10-CM

## 2023-11-20 DIAGNOSIS — E10.9 TYPE 1 DIABETES MELLITUS WITHOUT COMPLICATION (H): Primary | ICD-10-CM

## 2023-11-20 LAB
ANION GAP SERPL CALCULATED.3IONS-SCNC: 8 MMOL/L (ref 7–15)
BUN SERPL-MCNC: 19.8 MG/DL (ref 6–20)
CALCIUM SERPL-MCNC: 9.3 MG/DL (ref 8.6–10)
CHLORIDE SERPL-SCNC: 104 MMOL/L (ref 98–107)
CHOLEST SERPL-MCNC: 159 MG/DL
CREAT SERPL-MCNC: 0.91 MG/DL (ref 0.67–1.17)
CREAT UR-MCNC: 49.5 MG/DL
DEPRECATED HCO3 PLAS-SCNC: 28 MMOL/L (ref 22–29)
EGFRCR SERPLBLD CKD-EPI 2021: >90 ML/MIN/1.73M2
GLUCOSE SERPL-MCNC: 92 MG/DL (ref 70–99)
HBA1C MFR BLD: 6 %
HBA1C MFR BLD: 6.2 %
HDLC SERPL-MCNC: 65 MG/DL
LDLC SERPL CALC-MCNC: 86 MG/DL
MICROALBUMIN UR-MCNC: <12 MG/L
MICROALBUMIN/CREAT UR: NORMAL MG/G{CREAT}
NONHDLC SERPL-MCNC: 94 MG/DL
POTASSIUM SERPL-SCNC: 4.3 MMOL/L (ref 3.4–5.3)
SODIUM SERPL-SCNC: 140 MMOL/L (ref 135–145)
TRIGL SERPL-MCNC: 40 MG/DL
TSH SERPL DL<=0.005 MIU/L-ACNC: 2.2 UIU/ML (ref 0.3–4.2)

## 2023-11-20 PROCEDURE — 83036 HEMOGLOBIN GLYCOSYLATED A1C: CPT | Performed by: PATHOLOGY

## 2023-11-20 PROCEDURE — 99215 OFFICE O/P EST HI 40 MIN: CPT | Performed by: PHYSICIAN ASSISTANT

## 2023-11-20 PROCEDURE — 84443 ASSAY THYROID STIM HORMONE: CPT | Performed by: PATHOLOGY

## 2023-11-20 PROCEDURE — 83036 HEMOGLOBIN GLYCOSYLATED A1C: CPT | Performed by: PHYSICIAN ASSISTANT

## 2023-11-20 PROCEDURE — 80061 LIPID PANEL: CPT | Performed by: PATHOLOGY

## 2023-11-20 PROCEDURE — 80048 BASIC METABOLIC PNL TOTAL CA: CPT | Performed by: PATHOLOGY

## 2023-11-20 PROCEDURE — 36415 COLL VENOUS BLD VENIPUNCTURE: CPT | Performed by: PATHOLOGY

## 2023-11-20 PROCEDURE — 99000 SPECIMEN HANDLING OFFICE-LAB: CPT | Performed by: PATHOLOGY

## 2023-11-20 PROCEDURE — 82570 ASSAY OF URINE CREATININE: CPT | Performed by: PHYSICIAN ASSISTANT

## 2023-11-20 RX ORDER — URINE ACETONE TEST STRIPS
STRIP MISCELLANEOUS
Qty: 50 STRIP | Refills: 3 | Status: SHIPPED | OUTPATIENT
Start: 2023-11-20

## 2023-11-20 RX ORDER — INSULIN LISPRO 100 [IU]/ML
INJECTION, SOLUTION INTRAVENOUS; SUBCUTANEOUS
Qty: 45 ML | Refills: 3 | Status: SHIPPED | OUTPATIENT
Start: 2023-11-20 | End: 2024-05-20

## 2023-11-20 ASSESSMENT — PAIN SCALES - GENERAL: PAINLEVEL: NO PAIN (0)

## 2023-11-20 NOTE — PATIENT INSTRUCTIONS
Schedule eye exam.      Lower Lantus by 1-2 units.     Emergency issues: Please contact the clinic as soon as you recognize a problem.  Here are some concerns you should contact us about.  -Vomiting: more than twice.  Please check ketones.  If positive, go to ER. Monitor glucose hourly.   -High glucose (over 300 mg/dL twice in a row): Please check ketones.  If ketones are negative, take an insulin correction and recheck glucose in 1 hour.  If glucose is not coming down, please call the clinic. If ketones are moderate or large, drink lots of water, take an insulin correction, and recheck ketones in 1 hour.  If ketones are still high (or you are vomiting), go to the ER.  -Hypoglycemia (low glucose):   If glucose is less than 70 mg/dL, treat with 15g carb (4 glucose tablets), recheck glucose in 10 minutes.  If low again, repeat.   If glucose is less than 54 mg/dL, treat with 30g carb, recheck glucose in 10 minutes.  If low again, repeat.  Keep glucagon in your home in case of severe hypoglycemia and train someone how to use this.    Emergency kit (please ensure you always have these with you):   Glucose tablets  Glucagon  Insulin  Syringes (if on a pump)  Extra infusion set (if on a pump)  Ketone strips    Contact information:   If you have concerns, please send me a [employer] message or call the clinic at [phone].  For more urgent concerns, please call [phone] after hours/weekends and ask to speak with the endocrinologist on call.

## 2023-11-20 NOTE — LETTER
11/20/2023       RE: Ryan Garcia  [address]  [facility] [address]     Dear Colleague,    Thank you for referring your patient, Ryan Garcia, to the [facility] [place] at [facility]. Please see a copy of my visit note below.    11/15/2023  PATIENT LAB/IMAGING STATUS : MyChart sent to patient to complete standing/future orders   BG data obtained via CGM portal :    HPI:   Ryan is a very pleasant 32 yo man here for follow up of type 1 diabetes, diagnosed at 15 mos of age.  He also sees Dr. Santiago.  He has well controlled diabetes and has had no complications. He does have a history of severe hypoglycemia when he was switched to basaglar insulin.  No issues other than this. He is quite physically active and is able to manage his glucose well, even with activity.  He does something physical each day, running (although less in the winter as he prefers to run outside), weight lifting at the gym, etc. He finds that if he is not doing any cardio, blood sugars go up significantly at the gym.   He takes a 1 unit dose of insulin before he goes in order to prevent this.  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 Blood sugars have been pretty good.  Pre-bolusing more regularly.  Since his last visit, he has been adding more fiber into his diet and is trying to eat less saturated fats (meat/cheese).  His glucose has been stable and his post-breakfast spikes are not as high.     His current regimen is as follows:   Lantus- 21 units.   Humalog- 1/15g plus correction 1/50 over 150 mg/dL.     Breakfast- oatmeal, milk. Less of a spike after eating.   Lunch- varies- veggies, protein.   Dinner- veggies, meat, lower carb. Some potato.   Some venison, part of a cow, fish.      Goes for a run around 6-7pm.  Running before dinner usually.  20 minute run.      Ryan wears a carlos 3 sensor.  He  checks his glucose continuously with the sensor. His overall average glucose over the past 2 weeks is 126 mg/dL (CV 30%, TIR 91%).                 Diabetes Type: Type 1  Diabetes Duration: since age 15 mos.   Diabetes Control:   Lab Results   Component Value Date    A1C 6.6 02/14/2022    A1C 6.5 07/09/2018    A1C 6.7 01/04/2018       ROS  GENERAL: no weight loss, weight gain, fevers, chills, malaise, night sweats.   HEENT: no dysphagia, diplopia, neck pain or tenderness, dry/scratchy eyes, URI, cough, sinus drainage, tinnitus, sinus pressure  CV: no chest pain, pressure, palpitations, skipped beats, LOC  LUNGS: no SOB, FRY, cough, sputum production, wheezing   GI: no diarrhea, constipation, abdominal pain  EXTREMITIES: no rashes, ulcers, edema  NEUROLOGY: no changes in vision, tingling or numbness in hands or feet.   MSK: no muscle aches or pains, weakness  PSYCH: mood stable    Past Medical History:   Diagnosis Date    Type 1 diabetes (H)        No past surgical history on file.    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  Enjoys going to the gym/running.     Socioeconomic History    Marital status: Single     Spouse name: None    Number of children: None    Years of education: None    Highest education level: None   Tobacco Use    Smoking status: Never Smoker    Smokeless tobacco: Never Used   Substance and Sexual Activity    Alcohol use: Yes     Comment: 2 / week       Current Outpatient Medications   Medication    blood glucose (NO BRAND SPECIFIED) test strip    blood glucose meter (GLUCOMETER)    Continuous Blood Gluc Sensor (DEXCOM G6 SENSOR) MISC    Continuous Blood Gluc Sensor (FREESTYLE CAPRICE 3 SENSOR) MISC    Glucagon (BAQSIMI TWO PACK) 3 MG/DOSE POWD    HUMALOG KWIKPEN 100 UNIT/ML soln    Injection Device for insulin (INPEN  "100-GREY-JUANA-HUMALOG) LILIAN    insulin glargine (LANTUS PEN) 100 UNIT/ML pen    insulin lispro (HUMALOG KWIKPEN) 100 UNIT/ML (1 unit dial) KWIKPEN    insulin lispro (HUMALOG PENFILL) 100 UNIT/ML Cartridge    Insulin Lispro-aabc, 1 U Dial, (LYUMJEV KWIKPEN) 100 UNIT/ML SOPN    insulin pen needle (32G X 4 MM) 32G X 4 MM miscellaneous    KETOSTIX test strip    pen needle, diabetic (BD ULTRA-FINE KATRIN PEN NEEDLE) 32 gauge x 5/32\" Ndle     No current facility-administered medications for this visit.        No Known Allergies  Physical Exam  /72   Pulse 63   Wt 91.2 kg (201 lb)   SpO2 98%   BMI 28.03 kg/m    GENERAL:  Alert and oriented X3, NAD, well dressed, answering questions appropriately, appears stated age.  HEENT: OP clear, no lymphadenopathy, no thyromegaly, non-tender, no exophthalmus, no proptosis, EOMI, no lid lag, no retraction  CV: RRR, no murmurs  LUNGS: CTAB, no wheezes, rales, or ronchi  EXTREMITIES: no edema, +pulses, no rashes, no lesions  NEUROLOGY: + monofilament, +vibratory sensation, + DTR upper and lower extremity    RESULTS  Lab Results   Component Value Date    A1C 6.2 (H) 11/20/2023    A1C 6.6 (H) 02/14/2022    A1C 6.5 (H) 07/09/2018    A1C 6.7 (H) 01/04/2018       TSH   Date Value Ref Range Status   11/20/2023 2.20 0.30 - 4.20 uIU/mL Final   01/04/2018 1.52 0.30 - 5.00 uIU/mL Final       ALT   Date Value Ref Range Status   02/14/2022 31 0 - 45 U/L Final   ]    Recent Labs   Lab Test 11/20/23  0835 02/14/22  1205   CHOL 159 177   HDL 65 65   LDL 86 104   TRIG 40 42       Lab Results   Component Value Date     11/20/2023      Lab Results   Component Value Date    POTASSIUM 4.3 11/20/2023    POTASSIUM 4.7 02/14/2022     Lab Results   Component Value Date    CHLORIDE 104 11/20/2023    CHLORIDE 104 02/14/2022     Lab Results   Component Value Date    NOE 9.3 11/20/2023     Lab Results   Component Value Date    CO2 28 11/20/2023    CO2 28 02/14/2022     Lab Results   Component Value " "Date    BUN 19.8 11/20/2023    BUN 15 02/14/2022     Lab Results   Component Value Date    CR 0.91 11/20/2023       GFR Estimate   Date Value Ref Range Status   11/20/2023 >90 >60 mL/min/1.73m2 Final   02/14/2022 >90 >60 mL/min/1.73m2 Final     Comment:     Effective December 21, 2021 eGFRcr in adults is calculated using the 2021 CKD-EPI creatinine equation which includes age and gender (Pierre et al., NE, DOI: 10.1056/UVGNnc4075974)   07/09/2018 >60 >60 mL/min/1.73m2 Final   01/04/2018 >60 >60 mL/min/1.73m2 Final     GFR Estimate If Black   Date Value Ref Range Status   07/09/2018 >60 >60 mL/min/1.73m2 Final   01/04/2018 >60 >60 mL/min/1.73m2 Final       Lab Results   Component Value Date    MICROL <12.0 11/20/2023     No results found for: \"MICROALBUMIN\"  No results found for: \"CPEPT\", \"GADAB\", \"ISCAB\"    No results found for: \"B12\"]    Most recent eye exam date: : Not Found     Assessment/Plan:     1.  Type 1 diabetes- Excellent control. A1c is 6.2%.  He is having some hypoglycemia in the AM and minimal fluctuations otherwise.  His diet and lifestyle are excellent.  Encouragement given to continue with positive changes. Will try a warm up walk/jog before weight lifting instead of taking extra insulin to see if he can avoid the glucose rise.  We made the following plan today (instructions given to patient):      Schedule eye exam.      Lower Lantus by 1-2 units.     Emergency issues: Please contact the clinic as soon as you recognize a problem.  Here are some concerns you should contact us about.  -Vomiting: more than twice.  Please check ketones.  If positive, go to ER. Monitor glucose hourly.   -High glucose (over 300 mg/dL twice in a row): Please check ketones.  If ketones are negative, take an insulin correction and recheck glucose in 1 hour.  If glucose is not coming down, please call the clinic. If ketones are moderate or large, drink lots of water, take an insulin correction, and recheck ketones in 1 hour. "  If ketones are still high (or you are vomiting), go to the ER.  -Hypoglycemia (low glucose):   If glucose is less than 70 mg/dL, treat with 15g carb (4 glucose tablets), recheck glucose in 10 minutes.  If low again, repeat.   If glucose is less than 54 mg/dL, treat with 30g carb, recheck glucose in 10 minutes.  If low again, repeat.  Keep glucagon in your home in case of severe hypoglycemia and train someone how to use this.    Emergency kit (please ensure you always have these with you):   Glucose tablets  Glucagon  Insulin  Syringes (if on a pump)  Extra infusion set (if on a pump)  Ketone strips    Contact information:   If you have concerns, please send me a Aivo message or call the clinic at [phone].  For more urgent concerns, please call [phone] after hours/weekends and ask to speak with the endocrinologist on call.      2.  Risk factors-     Retinopathy:  No. Due for eye exam.  Will schedule.   Nephropathy:  BP historically well controlled. No microalbuminuria.  Creatinine stable.   Neuropathy: No.    Feet: OK, no ulcers.   Lipids:  LDL at target.  Statin not indicated.  Thyroid screening: TSH normal 11/2023.   Celiac screening: will screen.     3.  F/U in 3 mos with Dr. Santiago or myself, sooner with concerns.     42 minutes spent on the date of the encounter doing chart review, review of test results, review of continuous glucose sensor, interpretation of glucose data, patient visit and documentation, counseling/coordination of care, and discussion of follow up plan for worsening hyper and hypoglycemia.  The patient understood and is satisfied with today's visit.       Scarlett Weiner PA-C, MPAS   [facility]  Department of Medicine  Division of Endocrinology and Diabetes

## 2024-01-09 ENCOUNTER — TELEPHONE (OUTPATIENT)
Dept: ENDOCRINOLOGY | Facility: CLINIC | Age: 34
End: 2024-01-09
Payer: COMMERCIAL

## 2024-01-09 NOTE — TELEPHONE ENCOUNTER
PA Initiation    Medication: HUMALOG KWIKPEN 100 UNIT/ML SC SOPN  Insurance Company: [employer] - Phone [phone] Fax [phone]  Pharmacy Filling the Rx: [facility] #[id number] [place], [place] - [address] AT [facility] OF [address]  Filling Pharmacy Phone: [phone]  Filling Pharmacy Fax: [phone]  Start Date: 1/9/2024     Key: NORMAN

## 2024-01-11 NOTE — TELEPHONE ENCOUNTER
Prior Authorization Approval    Medication: HUMALOG KWIKPEN 100 UNIT/ML SC SOPN  Authorization Effective Date: 1/11/2024  Authorization Expiration Date: 1/11/2025  Approved Dose/Quantity: uud  Reference #: Key: [id number]   Insurance Company: [employer] - Phone [phone] Fax [phone]  Expected CoPay: $    CoPay Card Available:      Financial Assistance Needed:    Which Pharmacy is filling the prescription: [facility] #[id number] - [place], [place] - [address] AT [facility] [address]  Pharmacy Notified: Yes

## 2024-02-13 ENCOUNTER — TELEPHONE (OUTPATIENT)
Dept: ENDOCRINOLOGY | Facility: CLINIC | Age: 34
End: 2024-02-13
Payer: COMMERCIAL

## 2024-02-13 NOTE — TELEPHONE ENCOUNTER
PA Initiation    Medication: LANTUS SOLOSTAR   UNIT/ML SC SOPN  Insurance Company: [employer] - Phone [phone] Fax [phone]  Pharmacy Filling the Rx: [facility] #[id number] [place], [place] - [address] AT [facility] OF [address]  Filling Pharmacy Phone: [phone]  Filling Pharmacy Fax: [phone]  Start Date: 2/13/2024     Authorization Expiration Date: 2/27/2024   Key: [id number]

## 2024-02-14 NOTE — TELEPHONE ENCOUNTER
Prior Authorization Approval    Medication: LANTUS SOLOSTAR   UNIT/ML SC SOPN  Authorization Effective Date: 2/14/2024  Authorization Expiration Date: 2/14/2025  Approved Dose/Quantity: uud   Reference #: Key: [id number]   Insurance Company: [employer] - Phone [phone] Fax [phone]  Expected CoPay: $    CoPay Card Available:      Financial Assistance Needed:    Which Pharmacy is filling the prescription: [facility] #[id number] [place], [place] - [address] AT [facility] [address]  Pharmacy Notified: Yes  Patient Notified: Yes

## 2024-05-20 ENCOUNTER — VIRTUAL VISIT (OUTPATIENT)
Dept: ENDOCRINOLOGY | Facility: CLINIC | Age: 34
End: 2024-05-20
Payer: COMMERCIAL

## 2024-05-20 VITALS — BODY MASS INDEX: 27.92 KG/M2 | HEIGHT: 70 IN | WEIGHT: 195 LBS

## 2024-05-20 DIAGNOSIS — E10.9 TYPE 1 DIABETES MELLITUS WITHOUT COMPLICATION (H): ICD-10-CM

## 2024-05-20 DIAGNOSIS — E10.9 WELL CONTROLLED TYPE 1 DIABETES MELLITUS (H): Primary | ICD-10-CM

## 2024-05-20 PROCEDURE — 99213 OFFICE O/P EST LOW 20 MIN: CPT | Mod: 95 | Performed by: PHYSICIAN ASSISTANT

## 2024-05-20 RX ORDER — INSULIN LISPRO 100 [IU]/ML
INJECTION, SOLUTION INTRAVENOUS; SUBCUTANEOUS
Qty: 45 ML | Refills: 3 | Status: SHIPPED | OUTPATIENT
Start: 2024-05-20

## 2024-05-20 ASSESSMENT — PAIN SCALES - GENERAL: PAINLEVEL: NO PAIN (0)

## 2024-05-20 NOTE — PATIENT INSTRUCTIONS
Lower Lantus to 20 units daily.     Download summer.   Therapy settings:   Carb ratio- 1/13g (was 1/15g)  Correction factor- 45   Target glucose- 100  Correct above- 120

## 2024-05-20 NOTE — LETTER
5/20/2024       RE: Ryan Garcia  [address]  [facility] [address]     Dear Colleague,    Thank you for referring your patient, Ryan Garcia, to the [facility]CRINOLOG[facility] [place] [facility]. Please see a copy of my visit note below.    04/24/24 10:50 AM  PATIENT LAB/IMAGING STATUS : No pending lab orders   Patient is showing 5/5 MNCM met.   Outcome for 05/17/24 10:32 AM: Data obtained via [employer] website  Teresa Pollock CMA    Start time: 0809  End time: 0837  Provider location: off site- clinic  Patient location: off site- home.    HPI:   Ryan is a very pleasant 34 yo man here for follow up of type 1 diabetes, diagnosed at 15 mos of age.  He also sees Dr. Santiago.  He has well controlled diabetes and has had no complications. He does have a history of severe hypoglycemia when he was switched to basaglar insulin.  No issues other than this. He is quite physically active and is able to manage his glucose well, even with activity.  He does something physical each day, running, weight lifting at the gym, golfing, etc. He finds that if he is not doing any cardio, blood sugars go up significantly at the gym.   He takes a 1 unit dose of insulin before he goes in order to prevent this.  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 Blood sugars have been pretty good, but a little higher than in the past.  He is using the In-pen (likes 1/2 unit dosing), but never set up the summer on his phone.   Pre-bolusing more regularly 20-30 minutes before eating.  This works well. He has been adding more fiber into his diet and is trying to eat less saturated fats (meat/cheese).  His glucose has been stable and his post-breakfast spikes are not as high.     More active with nicer weather.  Golfing a lot.  Goes for a run around 6-7pm.  Running before dinner usually.  20 minute run.      His current regimen  is as follows:   Lantus- 21 units.   Humalog- 1/15g plus correction 1/50 over 150 mg/dL. Takes it 20-30 minutes before eating.     Breakfast- oatmeal, milk. Less of a spike after eating. 3 units  Lunch- varies- veggies, protein. 4 units  Dinner- veggies, meat, lower carb. Some potato. 4-5 units  Some venison, part of a cow, fish.    Snacks- 4-5     Ryan wears a carlos 3 sensor.  He checks his glucose continuously with the sensor. His overall average glucose over the past 2 weeks is 131 mg/dL (CV 25%, TIR 90%).                     Diabetes Type: Type 1  Diabetes Duration: since age 15 mos.   Diabetes Control:   Lab Results   Component Value Date    A1C 6.6 02/14/2022    A1C 6.5 07/09/2018    A1C 6.7 01/04/2018       ROS  GENERAL: no weight loss, weight gain, fevers, chills, malaise, night sweats.   HEENT: no dysphagia, diplopia, neck pain or tenderness, dry/scratchy eyes, URI, cough, sinus drainage, tinnitus, sinus pressure  CV: no chest pain, pressure, palpitations, skipped beats, LOC  LUNGS: no SOB, FRY, cough, sputum production, wheezing   GI: no diarrhea, constipation, abdominal pain  EXTREMITIES: no rashes, ulcers, edema  NEUROLOGY: no changes in vision, tingling or numbness in hands or feet.   MSK: no muscle aches or pains, weakness  PSYCH: mood stable    Past Medical History:   Diagnosis Date    Type 1 diabetes (H)        No past surgical history on file.    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  Enjoys going to the gym/running.     Socioeconomic History    Marital status: Single     Spouse name: None    Number of children: None    Years of education: None    Highest education level: None   Tobacco Use    Smoking status: Never Smoker    Smokeless tobacco: Never Used   Substance and Sexual Activity    Alcohol use: Yes      "Comment: 2 / week       Current Outpatient Medications   Medication Sig Dispense Refill    blood glucose (NO BRAND SPECIFIED) test strip Use to test blood sugar 8 times daily or as directed. 800 strip 3    blood glucose meter (GLUCOMETER) [BLOOD GLUCOSE METER (GLUCOMETER)] Use 1 each As Directed daily. Dispense glucometer brand per patient's insurance at pharmacy discretion. 1 each 6    Continuous Blood Gluc Sensor (DEXCOM G6 SENSOR) MISC Change every 10 days. 3 each 11    Continuous Blood Gluc Sensor (FREESTYLE CAPRICE 3 SENSOR) MISC 1 each every 14 days 7 each 3    Glucagon (BAQSIMI TWO PACK) 3 MG/DOSE POWD Spray 1 spray (3 mg) in nostril as needed (severe hypoglycemia) in the event of unconscious hypoglycemia or hypoglycemic seizure. May repeat dose if no response after 15 minutes. 2 each 3    HUMALOG KWIKPEN 100 UNIT/ML soln Inject subcu as directed. Approx 35 units daily. 45 mL 3    Injection Device for insulin (INPEN 100-GREY-JUANA-HUMALOG) LILIAN 1 each 4 times daily 2 each 3    insulin glargine (LANTUS PEN) 100 UNIT/ML pen Use as directed up to 22 units daily. 30 mL 3    insulin lispro (HUMALOG KWIKPEN) 100 UNIT/ML (1 unit dial) KWIKPEN Use 1U per 15 gms CHO coverage and humalog 1 U: 50 mg/dl above 150, total of 30 units daily 30 mL 3    insulin lispro (HUMALOG PENFILL) 100 UNIT/ML Cartridge Use as directed up to 35 units daily. 45 mL 3    insulin pen needle (32G X 4 MM) 32G X 4 MM miscellaneous Use 4 pen needles daily or as directed. 200 each 5    KETOSTIX test strip Use as directed in case of high glucose, vomiting or illness. 50 strip 3    KETOSTIX test strip Use as directed in case of high glucose or illness. 50 strip 3    pen needle, diabetic (BD ULTRA-FINE KATRIN PEN NEEDLE) 32 gauge x 5/32\" Ndle [PEN NEEDLE, DIABETIC (BD ULTRA-FINE KATRIN PEN NEEDLE) 32 GAUGE X 5/32\" NDLE] INJECT SUBQUTANEOUSLY EIGHT TIMES DAILY. 300 each 0     No current facility-administered medications for this visit.          Allergies " "  Allergen Reactions    Basagldenilson Dotypen [Insulin Glargine]      Severe hypoglycemia      Novolog [Insulin Aspart]      Severe hypoglycemia      Physical Exam:  Ht 1.778 m (5' 10\")   Wt 88.5 kg (195 lb)   BMI 27.98 kg/m    GENERAL: healthy, alert and no distress  RESP: no audible wheeze, cough, or visible cyanosis.  No visible retractions or increased work of breathing.  Able to speak fully in complete sentences.  PSYCH: mentation appears normal, affect normal/bright, judgement and insight intact, normal speech and appearance well-groomed    RESULTS  Lab Results   Component Value Date    A1C 6.0 (H) 11/20/2023    A1C 6.2 (H) 11/20/2023    A1C 6.6 (H) 02/14/2022    A1C 6.5 (H) 07/09/2018    A1C 6.7 (H) 01/04/2018       TSH   Date Value Ref Range Status   11/20/2023 2.20 0.30 - 4.20 uIU/mL Final   01/04/2018 1.52 0.30 - 5.00 uIU/mL Final       ALT   Date Value Ref Range Status   02/14/2022 31 0 - 45 U/L Final   ]    Recent Labs   Lab Test 11/20/23  0835 02/14/22  1205   CHOL 159 177   HDL 65 65   LDL 86 104   TRIG 40 42       Lab Results   Component Value Date     11/20/2023      Lab Results   Component Value Date    POTASSIUM 4.3 11/20/2023    POTASSIUM 4.7 02/14/2022     Lab Results   Component Value Date    CHLORIDE 104 11/20/2023    CHLORIDE 104 02/14/2022     Lab Results   Component Value Date    NOE 9.3 11/20/2023     Lab Results   Component Value Date    CO2 28 11/20/2023    CO2 28 02/14/2022     Lab Results   Component Value Date    BUN 19.8 11/20/2023    BUN 15 02/14/2022     Lab Results   Component Value Date    CR 0.91 11/20/2023       GFR Estimate   Date Value Ref Range Status   11/20/2023 >90 >60 mL/min/1.73m2 Final   02/14/2022 >90 >60 mL/min/1.73m2 Final     Comment:     Effective December 21, 2021 eGFRcr in adults is calculated using the 2021 CKD-EPI creatinine equation which includes age and gender (Pierre et al., NEJM, DOI: 10.1056/PWSDbu9413237)   07/09/2018 >60 >60 mL/min/1.73m2 Final " "  01/04/2018 >60 >60 mL/min/1.73m2 Final     GFR Estimate If Black   Date Value Ref Range Status   07/09/2018 >60 >60 mL/min/1.73m2 Final   01/04/2018 >60 >60 mL/min/1.73m2 Final       Lab Results   Component Value Date    MICROL <12.0 11/20/2023     No results found for: \"MICROALBUMIN\"  No results found for: \"CPEPT\", \"GADAB\", \"ISCAB\"    No results found for: \"B12\"]    Most recent eye exam date: : Not Found     Assessment/Plan:     1.  Type 1 diabetes- Excellent control. A1c is 6.0% at last check and CV is quite low at 25%.  He is not getting the whole benefit from the In-pen and he is interested in setting this up.  Will meet with DM education to do so.  He is having some hypoglycemia in the AM and minimal fluctuations otherwise. Will plan to gradually lower lantus and tighten carb ratio, as required.  Consider faster acting lyumjev in the future, however I do not believe this is available in cartridges yet. He also has had bad experiences with switching insulins in the past and is comfortable on humalog. We made the following plan today (instructions given to patient):      Lower Lantus to 20 units daily.     Download summer.     Therapy settings:   Carb ratio- 1/13g (was 1/15g)  Correction factor- 45   Target glucose- 100  Correct above- 120    Emergency issues: Please contact the clinic as soon as you recognize a problem.  Here are some concerns you should contact us about.  -Vomiting: more than twice.  Please check ketones.  If positive, go to ER. Monitor glucose hourly.   -High glucose (over 300 mg/dL twice in a row): Please check ketones.  If ketones are negative, take an insulin correction and recheck glucose in 1 hour.  If glucose is not coming down, please call the clinic. If ketones are moderate or large, drink lots of water, take an insulin correction, and recheck ketones in 1 hour.  If ketones are still high (or you are vomiting), go to the ER.  -Hypoglycemia (low glucose):   If glucose is less than 70 " mg/dL, treat with 15g carb (4 glucose tablets), recheck glucose in 10 minutes.  If low again, repeat.   If glucose is less than 54 mg/dL, treat with 30g carb, recheck glucose in 10 minutes.  If low again, repeat.  Keep glucagon in your home in case of severe hypoglycemia and train someone how to use this.    Emergency kit (please ensure you always have these with you):   Glucose tablets  Glucagon  Insulin  Syringes (if on a pump)  Extra infusion set (if on a pump)  Ketone strips    Contact information:   If you have concerns, please send me a Slanissue message or call the clinic at [phone].  For more urgent concerns, please call [phone] after hours/weekends and ask to speak with the endocrinologist on call.      2.  Risk factors-     Retinopathy:  No. Had recent eye exam.    Nephropathy:  BP historically well controlled. No microalbuminuria.  Creatinine stable.   Neuropathy: No.    Feet: OK, no ulcers.   Lipids:  LDL at target.  Statin not indicated.  Thyroid screening: TSH normal 11/2023.   Celiac screening: will screen.     3.  F/U in 6 mos, sooner with concerns.     28  minutes spent on the date of the encounter doing chart review, review of test results, review of continuous glucose sensor, interpretation of glucose data, patient visit and documentation, counseling/coordination of care, and discussion of follow up plan for worsening hyper and hypoglycemia.  The patient understood and is satisfied with today's visit.       Scarlett Weiner PA-C, MPAS   [facility]  Department of Medicine  Division of Endocrinology and Diabetes

## 2024-05-20 NOTE — PROGRESS NOTES
04/24/24 10:50 AM  PATIENT LAB/IMAGING STATUS : No pending lab orders   Patient is showing 5/5 MNCM met.   Outcome for 05/17/24 10:32 AM: Data obtained via [employer] website  Teresa ROXANN Pollock    Start time: 0809  End time: 0837  Provider location: off site- clinic  Patient location: off site- home.    HPI:   Ryan is a very pleasant 32 yo man here for follow up of type 1 diabetes, diagnosed at 15 mos of age.  He also sees Dr. Santiago.  He has well controlled diabetes and has had no complications. He does have a history of severe hypoglycemia when he was switched to basaglar insulin.  No issues other than this. He is quite physically active and is able to manage his glucose well, even with activity.  He does something physical each day, running, weight lifting at the gym, golfing, etc. He finds that if he is not doing any cardio, blood sugars go up significantly at the gym.   He takes a 1 unit dose of insulin before he goes in order to prevent this.  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 Blood sugars have been pretty good, but a little higher than in the past.  He is using the In-pen (likes 1/2 unit dosing), but never set up the summer on his phone.   Pre-bolusing more regularly 20-30 minutes before eating.  This works well. He has been adding more fiber into his diet and is trying to eat less saturated fats (meat/cheese).  His glucose has been stable and his post-breakfast spikes are not as high.     More active with nicer weather.  Golfing a lot.  Goes for a run around 6-7pm.  Running before dinner usually.  20 minute run.      His current regimen is as follows:   Lantus- 21 units.   Humalog- 1/15g plus correction 1/50 over 150 mg/dL. Takes it 20-30 minutes before eating.     Breakfast- oatmeal, milk. Less of a spike after eating. 3 units  Lunch- varies- veggies, protein. 4 units  Dinner- veggies, meat, lower carb. Some potato. 4-5 units  Some venison, part of a cow,  fish.    Snacks- 4-5     Ryan wears a carlos 3 sensor.  He checks his glucose continuously with the sensor. His overall average glucose over the past 2 weeks is 131 mg/dL (CV 25%, TIR 90%).                     Diabetes Type: Type 1  Diabetes Duration: since age 15 mos.   Diabetes Control:   Lab Results   Component Value Date    A1C 6.6 02/14/2022    A1C 6.5 07/09/2018    A1C 6.7 01/04/2018       ROS  GENERAL: no weight loss, weight gain, fevers, chills, malaise, night sweats.   HEENT: no dysphagia, diplopia, neck pain or tenderness, dry/scratchy eyes, URI, cough, sinus drainage, tinnitus, sinus pressure  CV: no chest pain, pressure, palpitations, skipped beats, LOC  LUNGS: no SOB, FRY, cough, sputum production, wheezing   GI: no diarrhea, constipation, abdominal pain  EXTREMITIES: no rashes, ulcers, edema  NEUROLOGY: no changes in vision, tingling or numbness in hands or feet.   MSK: no muscle aches or pains, weakness  PSYCH: mood stable    Past Medical History:   Diagnosis Date    Type 1 diabetes (H)        No past surgical history on file.    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  Enjoys going to the gym/running.     Socioeconomic History    Marital status: Single     Spouse name: None    Number of children: None    Years of education: None    Highest education level: None   Tobacco Use    Smoking status: Never Smoker    Smokeless tobacco: Never Used   Substance and Sexual Activity    Alcohol use: Yes     Comment: 2 / week       Current Outpatient Medications   Medication Sig Dispense Refill    blood glucose (NO BRAND SPECIFIED) test strip Use to test blood sugar 8 times daily or as directed. 800 strip 3    blood glucose meter (GLUCOMETER) [BLOOD GLUCOSE METER (GLUCOMETER)] Use 1 each As Directed daily. Dispense glucometer brand  "per patient's insurance at pharmacy discretion. 1 each 6    Continuous Blood Gluc Sensor (DEXCOM G6 SENSOR) MISC Change every 10 days. 3 each 11    Continuous Blood Gluc Sensor (FREESTYLE CAPRICE 3 SENSOR) MISC 1 each every 14 days 7 each 3    Glucagon (BAQSIMI TWO PACK) 3 MG/DOSE POWD Spray 1 spray (3 mg) in nostril as needed (severe hypoglycemia) in the event of unconscious hypoglycemia or hypoglycemic seizure. May repeat dose if no response after 15 minutes. 2 each 3    HUMALOG KWIKPEN 100 UNIT/ML soln Inject subcu as directed. Approx 35 units daily. 45 mL 3    Injection Device for insulin (INPEN 100-GREY-JUANA-HUMALOG) LILIAN 1 each 4 times daily 2 each 3    insulin glargine (LANTUS PEN) 100 UNIT/ML pen Use as directed up to 22 units daily. 30 mL 3    insulin lispro (HUMALOG KWIKPEN) 100 UNIT/ML (1 unit dial) KWIKPEN Use 1U per 15 gms CHO coverage and humalog 1 U: 50 mg/dl above 150, total of 30 units daily 30 mL 3    insulin lispro (HUMALOG PENFILL) 100 UNIT/ML Cartridge Use as directed up to 35 units daily. 45 mL 3    insulin pen needle (32G X 4 MM) 32G X 4 MM miscellaneous Use 4 pen needles daily or as directed. 200 each 5    KETOSTIX test strip Use as directed in case of high glucose, vomiting or illness. 50 strip 3    KETOSTIX test strip Use as directed in case of high glucose or illness. 50 strip 3    pen needle, diabetic (BD ULTRA-FINE KATRIN PEN NEEDLE) 32 gauge x 5/32\" Ndle [PEN NEEDLE, DIABETIC (BD ULTRA-FINE KATRIN PEN NEEDLE) 32 GAUGE X 5/32\" NDLE] INJECT SUBQUTANEOUSLY EIGHT TIMES DAILY. 300 each 0     No current facility-administered medications for this visit.          Allergies   Allergen Reactions    Basaglar Kwikpen [Insulin Glargine]      Severe hypoglycemia      Novolog [Insulin Aspart]      Severe hypoglycemia      Physical Exam:  Ht 1.778 m (5' 10\")   Wt 88.5 kg (195 lb)   BMI 27.98 kg/m    GENERAL: healthy, alert and no distress  RESP: no audible wheeze, cough, or visible cyanosis.  No visible " "retractions or increased work of breathing.  Able to speak fully in complete sentences.  PSYCH: mentation appears normal, affect normal/bright, judgement and insight intact, normal speech and appearance well-groomed    RESULTS  Lab Results   Component Value Date    A1C 6.0 (H) 11/20/2023    A1C 6.2 (H) 11/20/2023    A1C 6.6 (H) 02/14/2022    A1C 6.5 (H) 07/09/2018    A1C 6.7 (H) 01/04/2018       TSH   Date Value Ref Range Status   11/20/2023 2.20 0.30 - 4.20 uIU/mL Final   01/04/2018 1.52 0.30 - 5.00 uIU/mL Final       ALT   Date Value Ref Range Status   02/14/2022 31 0 - 45 U/L Final   ]    Recent Labs   Lab Test 11/20/23  0835 02/14/22  1205   CHOL 159 177   HDL 65 65   LDL 86 104   TRIG 40 42       Lab Results   Component Value Date     11/20/2023      Lab Results   Component Value Date    POTASSIUM 4.3 11/20/2023    POTASSIUM 4.7 02/14/2022     Lab Results   Component Value Date    CHLORIDE 104 11/20/2023    CHLORIDE 104 02/14/2022     Lab Results   Component Value Date    NOE 9.3 11/20/2023     Lab Results   Component Value Date    CO2 28 11/20/2023    CO2 28 02/14/2022     Lab Results   Component Value Date    BUN 19.8 11/20/2023    BUN 15 02/14/2022     Lab Results   Component Value Date    CR 0.91 11/20/2023       GFR Estimate   Date Value Ref Range Status   11/20/2023 >90 >60 mL/min/1.73m2 Final   02/14/2022 >90 >60 mL/min/1.73m2 Final     Comment:     Effective December 21, 2021 eGFRcr in adults is calculated using the 2021 CKD-EPI creatinine equation which includes age and gender (Pierre goodwin al., NEJ, DOI: 10.1056/ZSHNte4616923)   07/09/2018 >60 >60 mL/min/1.73m2 Final   01/04/2018 >60 >60 mL/min/1.73m2 Final     GFR Estimate If Black   Date Value Ref Range Status   07/09/2018 >60 >60 mL/min/1.73m2 Final   01/04/2018 >60 >60 mL/min/1.73m2 Final       Lab Results   Component Value Date    MICROL <12.0 11/20/2023     No results found for: \"MICROALBUMIN\"  No results found for: \"CPEPT\", \"GADAB\", " "\"ISCAB\"    No results found for: \"B12\"]    Most recent eye exam date: : Not Found     Assessment/Plan:     1.  Type 1 diabetes- Excellent control. A1c is 6.0% at last check and CV is quite low at 25%.  He is not getting the whole benefit from the In-pen and he is interested in setting this up.  Will meet with DM education to do so.  He is having some hypoglycemia in the AM and minimal fluctuations otherwise. Will plan to gradually lower lantus and tighten carb ratio, as required.  Consider faster acting lyumjev in the future, however I do not believe this is available in cartridges yet. He also has had bad experiences with switching insulins in the past and is comfortable on humalog. We made the following plan today (instructions given to patient):      Lower Lantus to 20 units daily.     Download summer.     Therapy settings:   Carb ratio- 1/13g (was 1/15g)  Correction factor- 45   Target glucose- 100  Correct above- 120    Emergency issues: Please contact the clinic as soon as you recognize a problem.  Here are some concerns you should contact us about.  -Vomiting: more than twice.  Please check ketones.  If positive, go to ER. Monitor glucose hourly.   -High glucose (over 300 mg/dL twice in a row): Please check ketones.  If ketones are negative, take an insulin correction and recheck glucose in 1 hour.  If glucose is not coming down, please call the clinic. If ketones are moderate or large, drink lots of water, take an insulin correction, and recheck ketones in 1 hour.  If ketones are still high (or you are vomiting), go to the ER.  -Hypoglycemia (low glucose):   If glucose is less than 70 mg/dL, treat with 15g carb (4 glucose tablets), recheck glucose in 10 minutes.  If low again, repeat.   If glucose is less than 54 mg/dL, treat with 30g carb, recheck glucose in 10 minutes.  If low again, repeat.  Keep glucagon in your home in case of severe hypoglycemia and train someone how to use this.    Emergency kit (please " ensure you always have these with you):   Glucose tablets  Glucagon  Insulin  Syringes (if on a pump)  Extra infusion set (if on a pump)  Ketone strips    Contact information:   If you have concerns, please send me a CatchFree message or call the clinic at [phone].  For more urgent concerns, please call [phone] after hours/weekends and ask to speak with the endocrinologist on call.      2.  Risk factors-     Retinopathy:  No. Had recent eye exam.    Nephropathy:  BP historically well controlled. No microalbuminuria.  Creatinine stable.   Neuropathy: No.    Feet: OK, no ulcers.   Lipids:  LDL at target.  Statin not indicated.  Thyroid screening: TSH normal 11/2023.   Celiac screening: will screen.     3.  F/U in 6 mos, sooner with concerns.     28  minutes spent on the date of the encounter doing chart review, review of test results, review of continuous glucose sensor, interpretation of glucose data, patient visit and documentation, counseling/coordination of care, and discussion of follow up plan for worsening hyper and hypoglycemia.  The patient understood and is satisfied with today's visit.       Scarlett Weiner PA-C, MPAS   [facility]  Department of Medicine  Division of Endocrinology and Diabetes

## 2024-05-25 ENCOUNTER — HEALTH MAINTENANCE LETTER (OUTPATIENT)
Age: 34
End: 2024-05-25

## 2024-05-29 ENCOUNTER — TELEPHONE (OUTPATIENT)
Dept: ENDOCRINOLOGY | Facility: CLINIC | Age: 34
End: 2024-05-29
Payer: COMMERCIAL

## 2024-05-29 NOTE — TELEPHONE ENCOUNTER
Left Voicemail (1st Attempt) and Sent Mychart (1st Attempt) for the patient to call back and schedule the following:    Appointment type: Return diabetes  Provider: Scarlett Weiner  Return date: 6 months (around 11/15)  Specialty phone number: [phone]  Additional appointment(s) needed: NA  Additonal Notes: NA

## 2024-06-17 ENCOUNTER — VIRTUAL VISIT (OUTPATIENT)
Dept: EDUCATION SERVICES | Facility: CLINIC | Age: 34
End: 2024-06-17
Attending: PHYSICIAN ASSISTANT
Payer: COMMERCIAL

## 2024-06-17 DIAGNOSIS — E10.9 WELL CONTROLLED TYPE 1 DIABETES MELLITUS (H): ICD-10-CM

## 2024-06-17 PROCEDURE — G0108 DIAB MANAGE TRN  PER INDIV: HCPCS | Mod: 95

## 2024-06-17 ASSESSMENT — PAIN SCALES - GENERAL: PAINLEVEL: NO PAIN (0)

## 2024-06-17 NOTE — PROGRESS NOTES
"Virtual Visit Details    Type of service:  Video Visit     Originating Location (pt. Location): Home    Distant Location (provider location):  Off-site  Platform used for Video Visit: Chelsie      Diabetes Self-Management Education & Support  Ryan Garcia presents today for education related to Type 1 diabetes    Patient is being treated with:  MDI Insulin  He is accompanied by self    Year of diagnosis: 1991, Age 15 months  Referring provider:  Husam  Living Situation: Home with parents  Employment: Chiropractor    PATIENT CONCERNS/REASON FOR REFERRAL   Set up In Pen Tiffany    ASSESSMENT:    Taking Medication:     Current Diabetes Management per Patient:  Taking diabetes medications?   Lantus 20 units at bedtime  Humalog ICR 1:15 + standard correction. TDD 20-25 units    Monitoring  Patient glucose self monitoring as follows: continuously using a continuous glucose monitor (CGM)  BG meter: Vee 3 CGM  CGM: Freestyle Vee 3  BG results:          Patient's most recent   Lab Results   Component Value Date    A1C 6.0 11/20/2023    A1C 6.2 11/20/2023      Patient's A1C goal: <7.0    Activity: 20 minute run or golf daily    Healthy Eating:  Patient currently eats 3 meals, 0 snacks per day   Breakfast: Oatmeal, milk  Lunch: Protein bar, aims 45-60 gram CHO, leftovers  Dinner 6-7pm: Salad, Protein, potatoes, sweet potato fries, fish, broccoli, skim milk  Tiffanie water, coffee (1-2 cups)    Problem Solving:    Patient is at risk of hypoglycemia?: Frequency is daily, feels lows around 75, symptoms \"feels weird,\" low energy, if severe low; confusion, treats with Gatorade  Hospitalizations for hyper or hypoglycemia: No    Healthy Coping and Stress Management:   Sources of stress identified by patient:  Other (please specify):  Starting business  Coping mechanisms identified by patient:  Other (please specify):  Golfing      EDUCATION and INSTRUCTION PROVIDED AT THIS VISIT:    Well controlled T1DM using In Pen without Tiffany, " seen today for assistance with setting In Pen Summer up.  Currently uses Vee 3, TIR 85%, with 3% mild lows; likely from injecting 30 minutes prior to eating, he does this to avoid big spikes. Reviewed:insulin;onset/peak/duration, 15-15 rule, and active insulin. He prefers to leave ICR at 15 vs 13 per Scarlett Weiner's recommendation. Briefly discussed benefits of pump therapy; does not like having things attached to him. May consider in future. Follow up in one month.     Explained how the In Pen application works to calculate more accurately the dose of rapid acting insulin to cover carbohydrates eaten and to correct blood glucoses that are over-target.      Explained the following terms:   Insulin to Carbohydrate ratio  Insulin Sensitivity Factor  Active insulin time  Blood Glucose Target   Maximum Calculated bolus    Explained the difference between carb counting, meal estimation, and fixed dosing.     The following therapy settings were entered:     Start time ICR  1 unit/gram Insulin Sensitivity Factor Target BG    06:00 AM 15 45 100 (correct above 120)   12:00 PM 15 45 100 (correct above 120)   6:00 PM 15 45 100 (correct above 120)   10:00 PM 15 50 120 (correct above 180)   Active Insulin Time:  3  Maximum Bolus:  10  Reminders:    Reviewed how to load the cartridge in the In Pen, and how to pair it with the phone application.    Discussed that the pen has a memory and will download data to the phone summer whenever it is in pairing range.   Had patient practice doing a meal bolus calculation, a blood glucose correction, and a combined calculation.    Successfully created and linked [employer] In Pen to [employer]        Patient-stated goal written and given to Ryan Garcia.  Verbalized and demonstrated understanding of instructions.   See patient instructions  AVS printed and given to patient-via My Chart    PLAN:  *No changes to insulin doses  *Trial injecting 20 minutes prior to meal vs 30 minutes  *15-15  rule    FOLLOW-UP:    *7/15  Video Visit at 8am with Rachana    Time spent with patient at today's visit was 63 minutes.      Rachana Peter RN, [facility]  Diabetes Education Department  [facility], [place]  Phone: [phone]  [phone]      Any diabetes medication dose changes were made via the CDE Protocol and Collaborative Practice Agreement with [place] and  Marisela.  A copy of this encounter was provided to patient's referring provider.

## 2024-06-17 NOTE — LETTER
6/17/2024      Ryan Garcia  [address]  [place] [address]      Dear Colleague,    Thank you for referring your patient, Ryan Garcia, to the [facility]. Please see a copy of my visit note below.    Virtual Visit Details    Type of service:  Video Visit     Originating Location (pt. Location): Home  {PROVIDER LOCATION On-site should be selected for visits conducted from your clinic location or adjoining [facility] hospital, academic office, or other nearby [facility] building. Off-site should be selected for all other provider locations, including home:[id number]}  Distant Location (provider location):  Off-site  Platform used for Video Visit: Next audience      Diabetes Self-Management Education & Support  Ryan Garcia presents today for education related to Type 1 diabetes    Patient is being treated with:  MDI Insulin  He is accompanied by self    Year of diagnosis: 1991, Age 15 months  Referring provider:  Husam  Living Situation: Home with parents  Employment: Chiropractor    PATIENT CONCERNS/REASON FOR REFERRAL   Set up In Pen Tiffany    ASSESSMENT:    Taking Medication:     Current Diabetes Management per Patient:  Taking diabetes medications?   Lantus 20 units at bedtime  Humalog ICR 1:15 + standard correction. TDD 20-25 units    Monitoring  Patient glucose self monitoring as follows: continuously using a continuous glucose monitor (CGM)  BG meter: Vee 3 CGM  CGM: Freestyle Vee 3  BG results:          Patient's most recent   Lab Results   Component Value Date    A1C 6.0 11/20/2023    A1C 6.2 11/20/2023      Patient's A1C goal: <7.0    Activity: 20 minute run or golf daily    Healthy Eating:  Patient currently eats 3 meals, 0 snacks per day   Breakfast: Oatmeal, milk  Lunch: Protein bar, aims 45-60 gram CHO, leftovers  Dinner 6-7pm: Salad, Protein, potatoes, sweet potato fries, fish, broccoli, skim milk  Tiffanie water, coffee (1-2 cups)    Problem Solving:    Patient is at  "risk of hypoglycemia?: Frequency is daily, feels lows around 75, symptoms \"feels weird,\" low energy, if severe low; confusion, treats with Gatorade  Hospitalizations for hyper or hypoglycemia: No    Healthy Coping and Stress Management:   Sources of stress identified by patient:  Other (please specify):  Starting business  Coping mechanisms identified by patient:  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  Currently uses Vee 3, TIR 85%, with 3% mild lows; likely from injecting 30 minutes prior to eating, he does this to avoid big spikes. Reviewed:insulin;onset/peak/duration, 15-15 rule, and active insulin. He prefers to leave ICR at 15 vs 13 per Scarlett Weiner's recommendation. Briefly discussed benefits of pump therapy; does not like having things attached to him. May consider in future. Follow up in one month.     Explained how the In Pen application works to calculate more accurately the dose of rapid acting insulin to cover carbohydrates eaten and to correct blood glucoses that are over-target.      Explained the following terms:   Insulin to Carbohydrate ratio  Insulin Sensitivity Factor  Active insulin time  Blood Glucose Target   Maximum Calculated bolus    Explained the difference between carb counting, meal estimation, and fixed dosing.     The following therapy settings were entered:     Start time ICR  1 unit/gram Insulin Sensitivity Factor Target BG    06:00 AM 15 45 100 (correct above 120)   12:00 PM 15 45 100 (correct above 120)   6:00 PM 15 45 100 (correct above 120)   10:00 PM 15 50 120 (correct above 180)   Active Insulin Time:  3  Maximum Bolus:  10  Reminders:    Reviewed how to load the cartridge in the In Pen, and how to pair it with the phone application.    Discussed that the pen has a memory and will download data to the phone tiffany whenever it is " in pairing range.   Had patient practice doing a meal bolus calculation, a blood glucose correction, and a combined calculation.    Successfully created and linked [employer] In Pen to [employer].        Patient-stated goal written and given to Ryan Garcia.  Verbalized and demonstrated understanding of instructions.   See patient instructions  AVS printed and given to patient-via My Chart    PLAN:  *No changes to insulin doses  *Trial injecting 20 minutes prior to meal vs 30 minutes  *15-15 rule    FOLLOW-UP:    *7/15  Video Visit at 8am with Rachana    Time spent with patient at today's visit was 63 minutes.      Rachana Peter RN, [facility]  Diabetes Education Department  [facility], [place]  Phone: [phone]  [phone]      Any diabetes medication dose changes were made via the CDE Protocol and Collaborative Practice Agreement with [place] and  Marisela.  A copy of this encounter was provided to patient's referring provider.        Again, thank you for allowing me to participate in the care of your patient.        Sincerely,        Rachana Peter RN

## 2024-06-17 NOTE — NURSING NOTE
Is the patient currently in the state of [place]? YES    Visit mode:VIDEO    If the visit is dropped, the patient can be reconnected by: VIDEO VISIT: Text to cell phone:   Telephone Information:   Mobile [phone]       Will anyone else be joining the visit? NO  (If patient encounters technical issues they should call [phone])    How would you like to obtain your AVS? MyChart    Are changes needed to the allergy or medication list? Pt stated no changes to allergies and Pt stated no med changes    Are refills needed on medications prescribed by this physician? NO    Reason for visit: Follow Up    Rosanna Arriola LPN LPN

## 2024-06-17 NOTE — PATIENT INSTRUCTIONS
PLAN:  *No changes to insulin doses  *Trial injecting 20 minutes prior to meal vs 30 minutes  *15-15 rule    FOLLOW-UP:    *7/15  Video Visit at 8am with Rachana

## 2024-07-01 ENCOUNTER — MYC MEDICAL ADVICE (OUTPATIENT)
Dept: ENDOCRINOLOGY | Facility: CLINIC | Age: 34
End: 2024-07-01
Payer: COMMERCIAL

## 2024-07-01 NOTE — LETTER
7/1/2024       RE: Ryan Garcia  [address]  [place] [address]     To Whom It May Concern,     I am writing on behalf of my patient, Ryan Garcia.  Ryan was recently called up for jury duty, however due to his type 1 diabetes, he will be unable to participate.  Ryan has wide fluctuations in his glucose level and requires constant monitoring with a continuous glucose monitor.  This is an summer on his phone, which would require him to keep his phone with him while in the court room.  According to the court policies, he would be unable to use his phone, so I feel it would be unsafe for him to participate in jury duty, unless accommodations could be made for him to view his glucose on his phone.  Please excuse him from jury duty at this time.      Please do not hesitate to reach out to me if you have any questions or concerns.     Sincerely,      Scarlett Weiner PA-C

## 2024-07-01 NOTE — LETTER
7/1/2024       RE: Ryan Garcia  [address]  [facility] [address]     To Whom It May Concern,     I am writing on behalf of my patient, Ryan Garcia.  Ryan was recently called up for jury duty, however at this time, Ryan is not able to participate in jury duty due to him not being able to manage his blood sugar levels appropriately due to his type 1 diabetes.     Please do not hesitate to reach out to me if you have any questions or concerns.     Sincerely,      Scarlett Weiner PA-C

## 2024-07-11 ENCOUNTER — MYC MEDICAL ADVICE (OUTPATIENT)
Dept: ENDOCRINOLOGY | Facility: CLINIC | Age: 34
End: 2024-07-11
Payer: COMMERCIAL

## 2024-07-11 ENCOUNTER — TELEPHONE (OUTPATIENT)
Dept: ENDOCRINOLOGY | Facility: CLINIC | Age: 34
End: 2024-07-11
Payer: COMMERCIAL

## 2024-07-12 NOTE — TELEPHONE ENCOUNTER
Pt requesting call back. Pt called and stated that the letter to be excused from jury duty was not acceptable to them, pt is needing to discuss. Pt stated they are needing this letter by 7/15

## 2024-07-15 ENCOUNTER — VIRTUAL VISIT (OUTPATIENT)
Dept: EDUCATION SERVICES | Facility: CLINIC | Age: 34
End: 2024-07-15
Payer: COMMERCIAL

## 2024-07-15 DIAGNOSIS — E11.9 DIABETES MELLITUS (H): Primary | ICD-10-CM

## 2024-07-15 PROCEDURE — G0108 DIAB MANAGE TRN  PER INDIV: HCPCS | Mod: 95

## 2024-07-15 NOTE — LETTER
7/15/2024      Ryan Garcia  [address]  [facility] [address]      Dear Colleague,    Thank you for referring your patient, Ryan Garcia, to the [facility]. Please see a copy of my visit note below.    Virtual Visit Details    Type of service:  Video Visit     Originating Location (pt. Location): Home  {PROVIDER LOCATION On-site should be selected for visits conducted from your clinic location or adjoining [facility] hospital, academic office, or other nearby [facility] building. Off-site should be selected for all other provider locations, including home:[id number]}  Distant Location (provider location):  Off-site  Platform used for Video Visit: [employer]    Diabetes Self-Management Education & Support  Ryan Garcia presents today for education related to Type 1 diabetes    Patient is being treated with:  MDI Insulin  He is accompanied by self    Year of diagnosis: 1991, Age 15 months  Referring provider:  Husam  Living Situation: Home with parents  Employment: Chiropractor    PATIENT CONCERNS/REASON FOR REFERRAL   No concerns, did have a few significant highs    ASSESSMENT:    Taking Medication:     Current Diabetes Management per Patient:  Taking diabetes medications?   Lantus 20 units at bedtime  Humalog ICR 1:15 + standard correction. TDD 20-25 units    Monitoring  Patient glucose self monitoring as follows: continuously using a continuous glucose monitor (CGM)  BG meter: Vee 3 CGM  CGM: Freestyle Vee 3  BG results:                Patient's most recent   Lab Results   Component Value Date    A1C 6.0 11/20/2023    A1C 6.2 11/20/2023      Patient's A1C goal: <7.0    Activity: 20 minute run or golf daily    Healthy Eating:  Patient currently eats 3 meals, 0 snacks per day   Breakfast: Oatmeal, milk  Lunch: Protein bar, aims 45-60 gram CHO, leftovers  Dinner 6-7pm: Salad, Protein, potatoes, sweet potato fries, fish, broccoli, skim milk  Tiffanie water, coffee (1-2  "cups)    Problem Solving:    Patient is at risk of hypoglycemia?: Frequency is daily, feels lows around 75, symptoms \"feels weird,\" low energy, if severe low; confusion, treats with Gatorade  Hospitalizations for hyper or hypoglycemia: No    Healthy Coping and Stress Management:   Sources of stress identified by patient:  Other (please specify):  Starting business  Coping mechanisms identified by patient:  Other (please specify):  Golfing      EDUCATION and INSTRUCTION PROVIDED AT THIS VISIT:    T1 seen to review glucose after setting up [place]. Unfortunately, misunderstood and is not using calculator. He does not like glucose to be >150, TIR goal is >90%. Vee data TIR 87%, TAR > 180 9%, with 2% mild lows. He did have 2 episodes of SG >400 due to over treating low and then underestimated ice cream coverage. He notices pattern of 7am rise after breakfast. Will strengthen ICR at 6am to 13. Going forward, he will use calculator. We did briefly discuss Medtronic 780 algorithm, in particular target of 100 for that tight control. Follow up with Scarlett Weiner in October. Diabetes Education as needed per patient request.     See patient instructions  AVS via My Chart  Patient-stated goal written and given to Ryan Garcia.  Verbalized and demonstrated understanding of instructions.     PLAN:  *Strengthen 6am ICR to 13  *Use \"recommended dose\" calculator    FOLLOW-UP:    *10/28 with Scarlett Weiner    Time spent with patient at today's visit was 48 minutes.      Rachana Peter RN, [facility]  Diabetes Education Department  [facility], [place]  Phone: [phone]  [phone]      Any diabetes medication dose changes were made via the CDE Protocol and Collaborative Practice Agreement with [facility] and [facility].  A copy of this encounter was provided to patient's referring provider.            Again, thank you for allowing me to participate in the care of your patient.  "       Sincerely,        Rachana Peter RN

## 2024-07-15 NOTE — NURSING NOTE
Current patient location: [place]    Is the patient currently in the state of [place]? YES    Visit mode:VIDEO    If the visit is dropped, the patient can be reconnected by: Unknown, patient connected via [employer] before appointment.    Will anyone else be joining the visit? NO  (If patient encounters technical issues they should call [phone]    How would you like to obtain your AVS? MyChart    Are changes needed to the allergy or medication list? No, patient did not believe any changes needed to be made to e-check in information for visit. VF did not review e-check in information again with patient due to this.     Are refills needed on medications prescribed by this physician? Unknown    Reason for visit: RECHECK    Evelyn HENDERSON

## 2024-07-15 NOTE — PROGRESS NOTES
"Virtual Visit Details    Type of service:  Video Visit     Originating Location (pt. Location): Home    Distant Location (provider location):  Off-site  Platform used for Video Visit: [employer]    Diabetes Self-Management Education & Support  Ryan Garcia presents today for education related to Type 1 diabetes    Patient is being treated with:  MDI Insulin  He is accompanied by self    Year of diagnosis: 1991, Age 15 months  Referring provider:  Husam  Living Situation: Home with parents  Employment: Chiropractor    PATIENT CONCERNS/REASON FOR REFERRAL   No concerns, did have a few significant highs    ASSESSMENT:    Taking Medication:     Current Diabetes Management per Patient:  Taking diabetes medications?   Lantus 20 units at bedtime  Humalog ICR 1:15 + standard correction. TDD 20-25 units    Monitoring  Patient glucose self monitoring as follows: continuously using a continuous glucose monitor (CGM)  BG meter: Vee 3 CGM  CGM: Freestyle Vee 3  BG results:                Patient's most recent   Lab Results   Component Value Date    A1C 6.0 11/20/2023    A1C 6.2 11/20/2023      Patient's A1C goal: <7.0    Activity: 20 minute run or golf daily    Healthy Eating:  Patient currently eats 3 meals, 0 snacks per day   Breakfast: Oatmeal, milk  Lunch: Protein bar, aims 45-60 gram CHO, leftovers  Dinner 6-7pm: Salad, Protein, potatoes, sweet potato fries, fish, broccoli, skim milk  Tiffanie water, coffee (1-2 cups)    Problem Solving:    Patient is at risk of hypoglycemia?: Frequency is daily, feels lows around 75, symptoms \"feels weird,\" low energy, if severe low; confusion, treats with Gatorade  Hospitalizations for hyper or hypoglycemia: No    Healthy Coping and Stress Management:   Sources of stress identified by patient:  Other (please specify):  Starting business  Coping mechanisms identified by patient:  Other (please specify):  Golfing      EDUCATION and INSTRUCTION PROVIDED AT THIS VISIT:    T1 seen to " "review glucose after setting up [place]. Unfortunately, misunderstood and is not using calculator. He does not like glucose to be >150, TIR goal is >90%. Vee data TIR 87%, TAR > 180 9%, with 2% mild lows. He did have 2 episodes of SG >400 due to over treating low and then underestimated ice cream coverage. He notices pattern of 7am rise after breakfast. Will strengthen ICR at 6am to 13. Going forward, he will use calculator. We did briefly discuss Medtronic 780 algorithm, in particular target of 100 for that tight control. Follow up with Scarlett Weiner in October. Diabetes Education as needed per patient request.     See patient instructions  AVS via My Chart  Patient-stated goal written and given to Ryan Garcia.  Verbalized and demonstrated understanding of instructions.     PLAN:  *Strengthen 6am ICR to 13  *Use \"recommended dose\" calculator    FOLLOW-UP:    *10/28 with Scarlett Weiner    Time spent with patient at today's visit was 48 minutes.      Rachana Peter RN, [facility]  Diabetes Education Department  [facility], [place]  Phone: [phone]  [phone]      Any diabetes medication dose changes were made via the CDE Protocol and Collaborative Practice Agreement with [facility] and  [facility].  A copy of this encounter was provided to patient's referring provider.          "

## 2024-07-15 NOTE — PATIENT INSTRUCTIONS
"PLAN:  *Strengthen 6am ICR to 13  *Use \"recommended dose\" calculator    FOLLOW-UP:    *10/28 with Scarlett Weiner  "

## 2024-09-03 ENCOUNTER — TELEPHONE (OUTPATIENT)
Dept: ENDOCRINOLOGY | Facility: CLINIC | Age: 34
End: 2024-09-03
Payer: COMMERCIAL

## 2024-09-03 DIAGNOSIS — E10.9 WELL CONTROLLED TYPE 1 DIABETES MELLITUS (H): Primary | ICD-10-CM

## 2024-09-03 NOTE — TELEPHONE ENCOUNTER
Prior Authorization Retail Medication Request    Medication/Dose: Freestyle carlos 3 sensors.1 every 14 days     Diagnosis and ICD code (if different than what is on RX):  E10.9     Previously Tried and Failed:    Rationale:    Controlled type 1 diabetes mellitus with hypoglycemia, with long-term current use of insulin       Insurance   Primary:   Insurance ID:      Secondary (if applicable):  Insurance ID:      Pharmacy Information (if different than what is on RX)  Name:    Phone:    Fax:

## 2024-09-04 NOTE — TELEPHONE ENCOUNTER
Prior Authorization Not Needed per Insurance    Medication: Freestyle carlos 3 sensors  is covered under patients formulary plan.      Pharmacy Notified:  Yes- rx . Per pharmacy, the Freestyle carlos 3 sensor is also on backorder. Provider may consider prescribing for the freestyle carlos 3 plus sensor.  Please send rx refill to pharmacy.  Patient Notified:  No    Please close encounter when finished.    Thank you,  [place] Prior Authorization Team  [phone]

## 2024-09-05 DIAGNOSIS — E10.9 TYPE 1 DIABETES MELLITUS WITHOUT COMPLICATION (H): Primary | ICD-10-CM

## 2024-09-05 RX ORDER — BLOOD-GLUCOSE SENSOR
EACH MISCELLANEOUS
Qty: 6 EACH | Refills: 1 | Status: SHIPPED | OUTPATIENT
Start: 2024-09-05 | End: 2024-09-16

## 2024-09-05 RX ORDER — BLOOD-GLUCOSE SENSOR
EACH MISCELLANEOUS
Qty: 6 EACH | Refills: 3 | Status: SHIPPED | OUTPATIENT
Start: 2024-09-05 | End: 2024-09-17

## 2024-09-16 ENCOUNTER — MYC REFILL (OUTPATIENT)
Dept: ENDOCRINOLOGY | Facility: CLINIC | Age: 34
End: 2024-09-16
Payer: COMMERCIAL

## 2024-09-16 ENCOUNTER — MYC MEDICAL ADVICE (OUTPATIENT)
Dept: ENDOCRINOLOGY | Facility: CLINIC | Age: 34
End: 2024-09-16
Payer: COMMERCIAL

## 2024-09-16 DIAGNOSIS — E10.9 WELL CONTROLLED TYPE 1 DIABETES MELLITUS (H): ICD-10-CM

## 2024-09-16 DIAGNOSIS — E10.9 TYPE 1 DIABETES MELLITUS WITHOUT COMPLICATION (H): ICD-10-CM

## 2024-09-16 RX ORDER — BLOOD-GLUCOSE SENSOR
EACH MISCELLANEOUS
Qty: 6 EACH | Refills: 1 | Status: SHIPPED | OUTPATIENT
Start: 2024-09-16

## 2024-09-17 RX ORDER — BLOOD-GLUCOSE SENSOR
EACH MISCELLANEOUS
Qty: 6 EACH | Refills: 3 | Status: SHIPPED | OUTPATIENT
Start: 2024-09-17

## 2024-09-17 NOTE — TELEPHONE ENCOUNTER
Continuous Glucose Sensor (FREESTYLE CAPRICE 3 PLUS SENSOR) MISC       Remaining refills sent to requested pharmacy.    My chart request.